# Patient Record
Sex: MALE | Race: BLACK OR AFRICAN AMERICAN | Employment: UNEMPLOYED | URBAN - METROPOLITAN AREA
[De-identification: names, ages, dates, MRNs, and addresses within clinical notes are randomized per-mention and may not be internally consistent; named-entity substitution may affect disease eponyms.]

---

## 2018-04-18 ENCOUNTER — APPOINTMENT (OUTPATIENT)
Dept: OTHER | Age: 43
End: 2018-04-18
Attending: FAMILY MEDICINE

## 2021-06-23 ENCOUNTER — HOSPITAL ENCOUNTER (OUTPATIENT)
Age: 46
Setting detail: OBSERVATION
Discharge: HOME | End: 2021-06-25
Attending: EMERGENCY MEDICINE

## 2021-06-23 ENCOUNTER — APPOINTMENT (OUTPATIENT)
Dept: GENERAL RADIOLOGY | Age: 46
End: 2021-06-23
Attending: EMERGENCY MEDICINE

## 2021-06-23 ENCOUNTER — APPOINTMENT (OUTPATIENT)
Dept: CT IMAGING | Age: 46
End: 2021-06-23
Attending: EMERGENCY MEDICINE

## 2021-06-23 DIAGNOSIS — R55 VASOVAGAL SYNCOPE: ICD-10-CM

## 2021-06-23 DIAGNOSIS — R73.9 HYPERGLYCEMIA: ICD-10-CM

## 2021-06-23 DIAGNOSIS — E78.2 MIXED DYSLIPIDEMIA: ICD-10-CM

## 2021-06-23 DIAGNOSIS — I11.9 HYPERTENSIVE HEART DISEASE WITHOUT HEART FAILURE: ICD-10-CM

## 2021-06-23 DIAGNOSIS — E11.69 TYPE 2 DIABETES MELLITUS WITH OTHER SPECIFIED COMPLICATION, UNSPECIFIED WHETHER LONG TERM INSULIN USE (CMD): ICD-10-CM

## 2021-06-23 DIAGNOSIS — N17.9 AKI (ACUTE KIDNEY INJURY) (CMD): Primary | ICD-10-CM

## 2021-06-23 LAB
ALBUMIN SERPL-MCNC: 3.5 G/DL (ref 3.6–5.1)
ALBUMIN/GLOB SERPL: 0.8 {RATIO} (ref 1–2.4)
ALP SERPL-CCNC: 59 UNITS/L (ref 45–117)
ALT SERPL-CCNC: 21 UNITS/L
ANION GAP SERPL CALC-SCNC: 15 MMOL/L (ref 10–20)
AST SERPL-CCNC: 13 UNITS/L
BASOPHILS # BLD: 0.1 K/MCL (ref 0–0.3)
BASOPHILS NFR BLD: 1 %
BILIRUB SERPL-MCNC: 0.4 MG/DL (ref 0.2–1)
BUN SERPL-MCNC: 34 MG/DL (ref 6–20)
BUN/CREAT SERPL: 14 (ref 7–25)
CALCIUM SERPL-MCNC: 9.2 MG/DL (ref 8.4–10.2)
CHLORIDE SERPL-SCNC: 93 MMOL/L (ref 98–107)
CO2 SERPL-SCNC: 28 MMOL/L (ref 21–32)
CREAT SERPL-MCNC: 2.41 MG/DL (ref 0.67–1.17)
DEPRECATED RDW RBC: 41 FL (ref 39–50)
EOSINOPHIL # BLD: 0.2 K/MCL (ref 0–0.5)
EOSINOPHIL NFR BLD: 2 %
ERYTHROCYTE [DISTWIDTH] IN BLOOD: 13.1 % (ref 11–15)
FASTING DURATION TIME PATIENT: ABNORMAL H
GFR SERPLBLD BASED ON 1.73 SQ M-ARVRAT: 36 ML/MIN/1.73M2
GLOBULIN SER-MCNC: 4.6 G/DL (ref 2–4)
GLUCOSE BLDC GLUCOMTR-MCNC: 431 MG/DL (ref 70–99)
GLUCOSE BLDC GLUCOMTR-MCNC: 560 MG/DL (ref 70–99)
GLUCOSE SERPL-MCNC: 577 MG/DL (ref 65–99)
HCT VFR BLD CALC: 36.7 % (ref 39–51)
HGB BLD-MCNC: 11.9 G/DL (ref 13–17)
IMM GRANULOCYTES # BLD AUTO: 0.1 K/MCL (ref 0–0.2)
IMM GRANULOCYTES # BLD: 1 %
LYMPHOCYTES # BLD: 3.2 K/MCL (ref 1–4.8)
LYMPHOCYTES NFR BLD: 32 %
MCH RBC QN AUTO: 27.6 PG (ref 26–34)
MCHC RBC AUTO-ENTMCNC: 32.4 G/DL (ref 32–36.5)
MCV RBC AUTO: 85.2 FL (ref 78–100)
MONOCYTES # BLD: 1.4 K/MCL (ref 0.3–0.9)
MONOCYTES NFR BLD: 14 %
NEUTROPHILS # BLD: 5.1 K/MCL (ref 1.8–7.7)
NEUTROPHILS NFR BLD: 50 %
NRBC BLD MANUAL-RTO: 0 /100 WBC
PLATELET # BLD AUTO: 351 K/MCL (ref 140–450)
POTASSIUM SERPL-SCNC: 3.9 MMOL/L (ref 3.4–5.1)
PROT SERPL-MCNC: 8.1 G/DL (ref 6.4–8.2)
RBC # BLD: 4.31 MIL/MCL (ref 4.5–5.9)
SODIUM SERPL-SCNC: 132 MMOL/L (ref 135–145)
TROPONIN I SERPL HS-MCNC: <0.02 NG/ML
WBC # BLD: 10 K/MCL (ref 4.2–11)

## 2021-06-23 PROCEDURE — 36415 COLL VENOUS BLD VENIPUNCTURE: CPT

## 2021-06-23 PROCEDURE — 71045 X-RAY EXAM CHEST 1 VIEW: CPT

## 2021-06-23 PROCEDURE — 70450 CT HEAD/BRAIN W/O DYE: CPT

## 2021-06-23 PROCEDURE — 99285 EMERGENCY DEPT VISIT HI MDM: CPT | Performed by: EMERGENCY MEDICINE

## 2021-06-23 PROCEDURE — 82010 KETONE BODYS QUAN: CPT | Performed by: EMERGENCY MEDICINE

## 2021-06-23 PROCEDURE — 82962 GLUCOSE BLOOD TEST: CPT

## 2021-06-23 PROCEDURE — 83036 HEMOGLOBIN GLYCOSYLATED A1C: CPT | Performed by: HOSPITALIST

## 2021-06-23 PROCEDURE — 85025 COMPLETE CBC W/AUTO DIFF WBC: CPT | Performed by: EMERGENCY MEDICINE

## 2021-06-23 PROCEDURE — 93005 ELECTROCARDIOGRAM TRACING: CPT | Performed by: EMERGENCY MEDICINE

## 2021-06-23 PROCEDURE — 84443 ASSAY THYROID STIM HORMONE: CPT | Performed by: HOSPITALIST

## 2021-06-23 PROCEDURE — 80053 COMPREHEN METABOLIC PANEL: CPT | Performed by: EMERGENCY MEDICINE

## 2021-06-23 PROCEDURE — 93010 ELECTROCARDIOGRAM REPORT: CPT | Performed by: INTERNAL MEDICINE

## 2021-06-23 PROCEDURE — G1004 CDSM NDSC: HCPCS

## 2021-06-23 PROCEDURE — 99285 EMERGENCY DEPT VISIT HI MDM: CPT

## 2021-06-23 PROCEDURE — 84484 ASSAY OF TROPONIN QUANT: CPT | Performed by: EMERGENCY MEDICINE

## 2021-06-23 ASSESSMENT — ENCOUNTER SYMPTOMS
DIARRHEA: 0
NAUSEA: 0
EYE PAIN: 0
SPEECH DIFFICULTY: 0
ABDOMINAL PAIN: 0
RHINORRHEA: 0
FEVER: 0
WEAKNESS: 1
BRUISES/BLEEDS EASILY: 0
CONSTIPATION: 0
TREMORS: 0
NUMBNESS: 0
DIAPHORESIS: 0
EYE REDNESS: 0
DIZZINESS: 0
CHILLS: 0
HEADACHES: 0
BACK PAIN: 0
VOMITING: 0
SHORTNESS OF BREATH: 0
FATIGUE: 0
POLYDIPSIA: 0
SORE THROAT: 0
COUGH: 0

## 2021-06-23 ASSESSMENT — PAIN SCALES - GENERAL: PAINLEVEL_OUTOF10: 0

## 2021-06-24 ENCOUNTER — APPOINTMENT (OUTPATIENT)
Dept: GENERAL RADIOLOGY | Age: 46
End: 2021-06-24
Attending: EMERGENCY MEDICINE

## 2021-06-24 ENCOUNTER — APPOINTMENT (OUTPATIENT)
Dept: CARDIOLOGY | Age: 46
End: 2021-06-24
Attending: HOSPITALIST

## 2021-06-24 LAB
ALBUMIN SERPL-MCNC: 3.3 G/DL (ref 3.6–5.1)
ALBUMIN/GLOB SERPL: 0.7 {RATIO} (ref 1–2.4)
ALP SERPL-CCNC: 51 UNITS/L (ref 45–117)
ALT SERPL-CCNC: 20 UNITS/L
ANION GAP SERPL CALC-SCNC: 14 MMOL/L (ref 10–20)
APPEARANCE UR: CLEAR
AST SERPL-CCNC: 12 UNITS/L
ATRIAL RATE (BPM): 89
B-OH-BUTYR SERPL-SCNC: 0.1 MMOL/L (ref 0–0.3)
BASE EXCESS / DEFICIT, ARTERIAL - RESPIRATORY: 3 MMOL/L (ref -2–3)
BASOPHILS # BLD: 0.1 K/MCL (ref 0–0.3)
BASOPHILS NFR BLD: 1 %
BDY SITE: ABNORMAL
BILIRUB SERPL-MCNC: 0.3 MG/DL (ref 0.2–1)
BILIRUB UR QL STRIP: NEGATIVE
BODY TEMPERATURE: 37 DEGREES
BUN SERPL-MCNC: 30 MG/DL (ref 6–20)
BUN/CREAT SERPL: 17 (ref 7–25)
CA-I BLD-SCNC: 1.16 MMOL/L (ref 1.15–1.29)
CALCIUM SERPL-MCNC: 9.2 MG/DL (ref 8.4–10.2)
CHLORIDE BLD-SCNC: 93 MMOL/L (ref 98–107)
CHLORIDE SERPL-SCNC: 98 MMOL/L (ref 98–107)
CHOLEST SERPL-MCNC: 114 MG/DL
CHOLEST/HDLC SERPL: 3 {RATIO}
CO2 SERPL-SCNC: 29 MMOL/L (ref 21–32)
COHGB MFR BLDV: 1.6 % (ref 1.5–15)
COLOR UR: YELLOW
CONDITION: ABNORMAL
CREAT SERPL-MCNC: 1.77 MG/DL (ref 0.67–1.17)
CREAT UR-MCNC: 62.71 MG/DL
DEPRECATED RDW RBC: 40.3 FL (ref 39–50)
EOSINOPHIL # BLD: 0.2 K/MCL (ref 0–0.5)
EOSINOPHIL NFR BLD: 2 %
ERYTHROCYTE [DISTWIDTH] IN BLOOD: 13.2 % (ref 11–15)
FASTING DURATION TIME PATIENT: ABNORMAL H
FIO2 ON VENT: 21 %
GFR SERPLBLD BASED ON 1.73 SQ M-ARVRAT: 53 ML/MIN/1.73M2
GLOBULIN SER-MCNC: 4.5 G/DL (ref 2–4)
GLUCOSE BLD-MCNC: 428 MG/DL (ref 65–99)
GLUCOSE BLDC GLUCOMTR-MCNC: 169 MG/DL (ref 70–99)
GLUCOSE BLDC GLUCOMTR-MCNC: 249 MG/DL (ref 70–99)
GLUCOSE BLDC GLUCOMTR-MCNC: 262 MG/DL (ref 70–99)
GLUCOSE BLDC GLUCOMTR-MCNC: 267 MG/DL (ref 70–99)
GLUCOSE BLDC GLUCOMTR-MCNC: 321 MG/DL (ref 70–99)
GLUCOSE BLDC GLUCOMTR-MCNC: 369 MG/DL (ref 70–99)
GLUCOSE SERPL-MCNC: 200 MG/DL (ref 65–99)
GLUCOSE UR STRIP-MCNC: >1000 MG/DL
HBA1C MFR BLD: 12.5 % (ref 4.5–5.6)
HBA1C MFR BLD: 12.9 % (ref 4.5–5.6)
HCO3 BLDA-SCNC: 27 MMOL/L (ref 22–28)
HCT VFR BLD CALC: 35 % (ref 39–51)
HDLC SERPL-MCNC: 38 MG/DL
HGB BLD-MCNC: 11.3 G/DL (ref 13–17)
HGB BLD-MCNC: 11.9 G/DL (ref 13–17)
HGB UR QL STRIP: NEGATIVE
HOROWITZ INDEX BLDA+IHG-RTO: 486 MM[HG] (ref 300–500)
IMM GRANULOCYTES # BLD AUTO: 0.1 K/MCL (ref 0–0.2)
IMM GRANULOCYTES # BLD: 1 %
KETONES UR STRIP-MCNC: NEGATIVE MG/DL
LACTATE BLDA-SCNC: 2.3 MMOL/L
LDLC SERPL CALC-MCNC: 45 MG/DL
LEUKOCYTE ESTERASE UR QL STRIP: NEGATIVE
LYMPHOCYTES # BLD: 2.9 K/MCL (ref 1–4.8)
LYMPHOCYTES NFR BLD: 31 %
MAGNESIUM SERPL-MCNC: 2.4 MG/DL (ref 1.7–2.4)
MCH RBC QN AUTO: 27.1 PG (ref 26–34)
MCHC RBC AUTO-ENTMCNC: 32.3 G/DL (ref 32–36.5)
MCV RBC AUTO: 83.9 FL (ref 78–100)
METHGB MFR BLDMV: 0.6 %
MONOCYTES # BLD: 1.1 K/MCL (ref 0.3–0.9)
MONOCYTES NFR BLD: 12 %
NEUTROPHILS # BLD: 4.9 K/MCL (ref 1.8–7.7)
NEUTROPHILS NFR BLD: 53 %
NITRITE UR QL STRIP: NEGATIVE
NONHDLC SERPL-MCNC: 76 MG/DL
NRBC BLD MANUAL-RTO: 0 /100 WBC
OXYHGB MFR BLDA: 97.2 % (ref 94–98)
P AXIS (DEGREES): 26
PCO2 BLDA: 35 MM HG (ref 35–48)
PH BLDA: 7.49 UNITS (ref 7.35–7.45)
PH UR STRIP: 6 [PH] (ref 5–7)
PLATELET # BLD AUTO: 332 K/MCL (ref 140–450)
PO2 BLDA: 102 MM HG (ref 83–108)
POTASSIUM BLD-SCNC: 3.7 MMOL/L (ref 3.4–5.1)
POTASSIUM SERPL-SCNC: 3.2 MMOL/L (ref 3.4–5.1)
POTASSIUM SERPL-SCNC: 3.6 MMOL/L (ref 3.4–5.1)
PR-INTERVAL (MSEC): 180
PROT SERPL-MCNC: 7.8 G/DL (ref 6.4–8.2)
PROT UR STRIP-MCNC: NEGATIVE MG/DL
PROT UR-MCNC: 20 MG/DL
PROT/CREAT UR: 319 MGPR/GCR
QRS-INTERVAL (MSEC): 92
QT-INTERVAL (MSEC): 380
QTC: 462
R AXIS (DEGREES): 68
RAINBOW EXTRA TUBES HOLD SPECIMEN: NORMAL
RBC # BLD: 4.17 MIL/MCL (ref 4.5–5.9)
REPORT TEXT: NORMAL
SAO2 % BLDA: 16 % (ref 15–23)
SAO2 % BLDA: 99 % (ref 95–99)
SARS-COV-2 RNA RESP QL NAA+PROBE: NOT DETECTED
SERVICE CMNT-IMP: NORMAL
SERVICE CMNT-IMP: NORMAL
SODIUM BLD-SCNC: 132 MMOL/L (ref 135–145)
SODIUM SERPL-SCNC: 138 MMOL/L (ref 135–145)
SODIUM UR-SCNC: 79 MMOL/L
SP GR UR STRIP: 1.01 (ref 1–1.03)
T AXIS (DEGREES): 27
TRIGL SERPL-MCNC: 156 MG/DL
TRIGL SERPL-MCNC: 257 MG/DL
TROPONIN I SERPL HS-MCNC: <0.02 NG/ML
TROPONIN I SERPL HS-MCNC: <0.02 NG/ML
TSH SERPL-ACNC: 1.36 MCUNITS/ML (ref 0.35–5)
UROBILINOGEN UR STRIP-MCNC: 0.2 MG/DL
VENTRICULAR RATE EKG/MIN (BPM): 89
WBC # BLD: 9.3 K/MCL (ref 4.2–11)

## 2021-06-24 PROCEDURE — 82962 GLUCOSE BLOOD TEST: CPT

## 2021-06-24 PROCEDURE — 83605 ASSAY OF LACTIC ACID: CPT

## 2021-06-24 PROCEDURE — 10002800 HB RX 250 W HCPCS: Performed by: HOSPITALIST

## 2021-06-24 PROCEDURE — 73630 X-RAY EXAM OF FOOT: CPT

## 2021-06-24 PROCEDURE — 93306 TTE W/DOPPLER COMPLETE: CPT | Performed by: INTERNAL MEDICINE

## 2021-06-24 PROCEDURE — 10002803 HB RX 637: Performed by: FAMILY MEDICINE

## 2021-06-24 PROCEDURE — 83036 HEMOGLOBIN GLYCOSYLATED A1C: CPT | Performed by: FAMILY MEDICINE

## 2021-06-24 PROCEDURE — G0378 HOSPITAL OBSERVATION PER HR: HCPCS

## 2021-06-24 PROCEDURE — 82330 ASSAY OF CALCIUM: CPT

## 2021-06-24 PROCEDURE — 85025 COMPLETE CBC W/AUTO DIFF WBC: CPT | Performed by: HOSPITALIST

## 2021-06-24 PROCEDURE — 83735 ASSAY OF MAGNESIUM: CPT | Performed by: HOSPITALIST

## 2021-06-24 PROCEDURE — 82375 ASSAY CARBOXYHB QUANT: CPT

## 2021-06-24 PROCEDURE — 10002800 HB RX 250 W HCPCS: Performed by: EMERGENCY MEDICINE

## 2021-06-24 PROCEDURE — 71045 X-RAY EXAM CHEST 1 VIEW: CPT

## 2021-06-24 PROCEDURE — 84484 ASSAY OF TROPONIN QUANT: CPT | Performed by: HOSPITALIST

## 2021-06-24 PROCEDURE — 96372 THER/PROPH/DIAG INJ SC/IM: CPT

## 2021-06-24 PROCEDURE — 36600 WITHDRAWAL OF ARTERIAL BLOOD: CPT

## 2021-06-24 PROCEDURE — 10004651 HB RX, NO CHARGE ITEM: Performed by: HOSPITALIST

## 2021-06-24 PROCEDURE — 84300 ASSAY OF URINE SODIUM: CPT | Performed by: HOSPITALIST

## 2021-06-24 PROCEDURE — 84484 ASSAY OF TROPONIN QUANT: CPT | Performed by: FAMILY MEDICINE

## 2021-06-24 PROCEDURE — 10002807 HB RX 258: Performed by: EMERGENCY MEDICINE

## 2021-06-24 PROCEDURE — 87635 SARS-COV-2 COVID-19 AMP PRB: CPT | Performed by: EMERGENCY MEDICINE

## 2021-06-24 PROCEDURE — 84478 ASSAY OF TRIGLYCERIDES: CPT | Performed by: INTERNAL MEDICINE

## 2021-06-24 PROCEDURE — 10002803 HB RX 637: Performed by: INTERNAL MEDICINE

## 2021-06-24 PROCEDURE — 80061 LIPID PANEL: CPT | Performed by: HOSPITALIST

## 2021-06-24 PROCEDURE — 85018 HEMOGLOBIN: CPT

## 2021-06-24 PROCEDURE — 99245 OFF/OP CONSLTJ NEW/EST HI 55: CPT | Performed by: INTERNAL MEDICINE

## 2021-06-24 PROCEDURE — 81003 URINALYSIS AUTO W/O SCOPE: CPT | Performed by: HOSPITALIST

## 2021-06-24 PROCEDURE — 36415 COLL VENOUS BLD VENIPUNCTURE: CPT | Performed by: HOSPITALIST

## 2021-06-24 PROCEDURE — 93306 TTE W/DOPPLER COMPLETE: CPT

## 2021-06-24 PROCEDURE — 84295 ASSAY OF SERUM SODIUM: CPT

## 2021-06-24 PROCEDURE — 84156 ASSAY OF PROTEIN URINE: CPT | Performed by: HOSPITALIST

## 2021-06-24 PROCEDURE — 82947 ASSAY GLUCOSE BLOOD QUANT: CPT

## 2021-06-24 PROCEDURE — 84132 ASSAY OF SERUM POTASSIUM: CPT | Performed by: HOSPITALIST

## 2021-06-24 PROCEDURE — 10002803 HB RX 637: Performed by: HOSPITALIST

## 2021-06-24 PROCEDURE — 99244 OFF/OP CNSLTJ NEW/EST MOD 40: CPT | Performed by: INTERNAL MEDICINE

## 2021-06-24 PROCEDURE — 99220 INITIAL OBSERVATION CARE,LEVL III: CPT | Performed by: FAMILY MEDICINE

## 2021-06-24 PROCEDURE — 10002807 HB RX 258: Performed by: HOSPITALIST

## 2021-06-24 PROCEDURE — 80053 COMPREHEN METABOLIC PANEL: CPT | Performed by: HOSPITALIST

## 2021-06-24 PROCEDURE — 73560 X-RAY EXAM OF KNEE 1 OR 2: CPT

## 2021-06-24 RX ORDER — VALSARTAN AND HYDROCHLOROTHIAZIDE 160; 25 MG/1; MG/1
1 TABLET ORAL DAILY
Status: ON HOLD | COMMUNITY
End: 2021-06-25 | Stop reason: HOSPADM

## 2021-06-24 RX ORDER — COLCHICINE 0.6 MG/1
0.6 TABLET ORAL DAILY
Status: DISCONTINUED | OUTPATIENT
Start: 2021-06-24 | End: 2021-06-25 | Stop reason: HOSPADM

## 2021-06-24 RX ORDER — DEXTROSE MONOHYDRATE 25 G/50ML
25 INJECTION, SOLUTION INTRAVENOUS PRN
Status: DISCONTINUED | OUTPATIENT
Start: 2021-06-24 | End: 2021-06-25 | Stop reason: HOSPADM

## 2021-06-24 RX ORDER — GABAPENTIN 300 MG/1
300 CAPSULE ORAL DAILY
Status: ON HOLD | COMMUNITY
End: 2021-06-25 | Stop reason: HOSPADM

## 2021-06-24 RX ORDER — GLIPIZIDE 10 MG/1
10 TABLET ORAL 2 TIMES DAILY
Status: ON HOLD | COMMUNITY
End: 2021-06-25 | Stop reason: HOSPADM

## 2021-06-24 RX ORDER — DICLOFENAC SODIUM 75 MG/1
75 TABLET, DELAYED RELEASE ORAL 2 TIMES DAILY
Status: DISCONTINUED | OUTPATIENT
Start: 2021-06-24 | End: 2021-06-25

## 2021-06-24 RX ORDER — PRAVASTATIN SODIUM 20 MG
20 TABLET ORAL NIGHTLY
Status: DISCONTINUED | OUTPATIENT
Start: 2021-06-24 | End: 2021-06-25

## 2021-06-24 RX ORDER — GEMFIBROZIL 600 MG/1
600 TABLET, FILM COATED ORAL DAILY
Status: ON HOLD | COMMUNITY
End: 2021-06-25 | Stop reason: ALTCHOICE

## 2021-06-24 RX ORDER — INSULIN LISPRO 200 [IU]/ML
10 INJECTION, SOLUTION SUBCUTANEOUS
Status: ON HOLD | COMMUNITY
End: 2021-06-25 | Stop reason: HOSPADM

## 2021-06-24 RX ORDER — 0.9 % SODIUM CHLORIDE 0.9 %
2 VIAL (ML) INJECTION EVERY 12 HOURS SCHEDULED
Status: DISCONTINUED | OUTPATIENT
Start: 2021-06-24 | End: 2021-06-25 | Stop reason: HOSPADM

## 2021-06-24 RX ORDER — HEPARIN SODIUM 5000 [USP'U]/ML
5000 INJECTION, SOLUTION INTRAVENOUS; SUBCUTANEOUS EVERY 8 HOURS SCHEDULED
Status: DISCONTINUED | OUTPATIENT
Start: 2021-06-24 | End: 2021-06-25 | Stop reason: HOSPADM

## 2021-06-24 RX ORDER — ALLOPURINOL 300 MG/1
300 TABLET ORAL DAILY
COMMUNITY

## 2021-06-24 RX ORDER — ALLOPURINOL 300 MG/1
300 TABLET ORAL DAILY
Status: DISCONTINUED | OUTPATIENT
Start: 2021-06-24 | End: 2021-06-25 | Stop reason: HOSPADM

## 2021-06-24 RX ORDER — GLIPIZIDE 5 MG/1
5 TABLET ORAL 2 TIMES DAILY WITH MEALS
Status: DISCONTINUED | OUTPATIENT
Start: 2021-06-24 | End: 2021-06-24

## 2021-06-24 RX ORDER — INSULIN GLARGINE 100 [IU]/ML
25 INJECTION, SOLUTION SUBCUTANEOUS NIGHTLY
Status: DISCONTINUED | OUTPATIENT
Start: 2021-06-25 | End: 2021-06-24

## 2021-06-24 RX ORDER — AMOXICILLIN 250 MG
2 CAPSULE ORAL DAILY PRN
Status: DISCONTINUED | OUTPATIENT
Start: 2021-06-24 | End: 2021-06-25 | Stop reason: HOSPADM

## 2021-06-24 RX ORDER — INSULIN HUMAN 100 [IU]/ML
38 INJECTION, SUSPENSION SUBCUTANEOUS 2 TIMES DAILY WITH MEALS
Status: ON HOLD | COMMUNITY
End: 2021-06-25 | Stop reason: SDUPTHER

## 2021-06-24 RX ORDER — CARVEDILOL 25 MG/1
25 TABLET ORAL 2 TIMES DAILY
Status: DISCONTINUED | OUTPATIENT
Start: 2021-06-24 | End: 2021-06-25 | Stop reason: HOSPADM

## 2021-06-24 RX ORDER — ONDANSETRON 2 MG/ML
4 INJECTION INTRAMUSCULAR; INTRAVENOUS 4 TIMES DAILY PRN
Status: DISCONTINUED | OUTPATIENT
Start: 2021-06-24 | End: 2021-06-25 | Stop reason: HOSPADM

## 2021-06-24 RX ORDER — GLIPIZIDE 10 MG/1
10 TABLET ORAL 2 TIMES DAILY
Status: DISCONTINUED | OUTPATIENT
Start: 2021-06-24 | End: 2021-06-24

## 2021-06-24 RX ORDER — SODIUM CHLORIDE 9 MG/ML
INJECTION, SOLUTION INTRAVENOUS CONTINUOUS
Status: DISCONTINUED | OUTPATIENT
Start: 2021-06-24 | End: 2021-06-25 | Stop reason: HOSPADM

## 2021-06-24 RX ORDER — ACETAMINOPHEN 325 MG/1
650 TABLET ORAL EVERY 4 HOURS PRN
Status: DISCONTINUED | OUTPATIENT
Start: 2021-06-24 | End: 2021-06-25 | Stop reason: HOSPADM

## 2021-06-24 RX ORDER — GABAPENTIN 300 MG/1
300 CAPSULE ORAL DAILY
Status: DISCONTINUED | OUTPATIENT
Start: 2021-06-24 | End: 2021-06-24

## 2021-06-24 RX ORDER — INSULIN LISPRO 100 [IU]/ML
10 INJECTION, SOLUTION INTRAVENOUS; SUBCUTANEOUS ONCE
Status: COMPLETED | OUTPATIENT
Start: 2021-06-24 | End: 2021-06-24

## 2021-06-24 RX ORDER — GABAPENTIN 300 MG/1
300 CAPSULE ORAL 3 TIMES DAILY
Status: DISCONTINUED | OUTPATIENT
Start: 2021-06-24 | End: 2021-06-25 | Stop reason: HOSPADM

## 2021-06-24 RX ORDER — DEXTROSE MONOHYDRATE 25 G/50ML
12.5 INJECTION, SOLUTION INTRAVENOUS PRN
Status: DISCONTINUED | OUTPATIENT
Start: 2021-06-24 | End: 2021-06-25 | Stop reason: HOSPADM

## 2021-06-24 RX ORDER — CARVEDILOL 25 MG/1
25 TABLET ORAL 2 TIMES DAILY
COMMUNITY

## 2021-06-24 RX ORDER — POTASSIUM CHLORIDE 20 MEQ/1
40 TABLET, EXTENDED RELEASE ORAL ONCE
Status: COMPLETED | OUTPATIENT
Start: 2021-06-24 | End: 2021-06-24

## 2021-06-24 RX ORDER — POLYETHYLENE GLYCOL 3350 17 G/17G
17 POWDER, FOR SOLUTION ORAL DAILY PRN
Status: DISCONTINUED | OUTPATIENT
Start: 2021-06-24 | End: 2021-06-25 | Stop reason: HOSPADM

## 2021-06-24 RX ORDER — PRAVASTATIN SODIUM 20 MG
20 TABLET ORAL DAILY
Status: ON HOLD | COMMUNITY
End: 2021-06-25 | Stop reason: HOSPADM

## 2021-06-24 RX ORDER — INSULIN GLARGINE 100 [IU]/ML
15 INJECTION, SOLUTION SUBCUTANEOUS ONCE
Status: COMPLETED | OUTPATIENT
Start: 2021-06-24 | End: 2021-06-24

## 2021-06-24 RX ORDER — INSULIN GLARGINE 100 [IU]/ML
22 INJECTION, SOLUTION SUBCUTANEOUS NIGHTLY
Status: DISCONTINUED | OUTPATIENT
Start: 2021-06-25 | End: 2021-06-24

## 2021-06-24 RX ORDER — HYDRALAZINE HYDROCHLORIDE 20 MG/ML
10 INJECTION INTRAMUSCULAR; INTRAVENOUS EVERY 6 HOURS PRN
Status: DISCONTINUED | OUTPATIENT
Start: 2021-06-24 | End: 2021-06-25 | Stop reason: HOSPADM

## 2021-06-24 RX ORDER — DICLOFENAC SODIUM 75 MG/1
75 TABLET, DELAYED RELEASE ORAL 2 TIMES DAILY
COMMUNITY

## 2021-06-24 RX ORDER — INSULIN GLARGINE 100 [IU]/ML
15 INJECTION, SOLUTION SUBCUTANEOUS NIGHTLY
Status: DISCONTINUED | OUTPATIENT
Start: 2021-06-25 | End: 2021-06-24

## 2021-06-24 RX ORDER — HYDROCODONE BITARTRATE AND ACETAMINOPHEN 5; 325 MG/1; MG/1
1 TABLET ORAL EVERY 4 HOURS PRN
Status: DISCONTINUED | OUTPATIENT
Start: 2021-06-24 | End: 2021-06-25 | Stop reason: HOSPADM

## 2021-06-24 RX ORDER — HYDROCHLOROTHIAZIDE 25 MG/1
25 TABLET ORAL DAILY
Status: DISCONTINUED | OUTPATIENT
Start: 2021-06-25 | End: 2021-06-25

## 2021-06-24 RX ORDER — VALSARTAN 160 MG/1
160 TABLET ORAL DAILY
Status: DISCONTINUED | OUTPATIENT
Start: 2021-06-25 | End: 2021-06-25 | Stop reason: HOSPADM

## 2021-06-24 RX ORDER — PRAVASTATIN SODIUM 20 MG
20 TABLET ORAL 2 TIMES DAILY
Status: ON HOLD | COMMUNITY
End: 2021-06-24

## 2021-06-24 RX ORDER — NICOTINE POLACRILEX 4 MG
30 LOZENGE BUCCAL PRN
Status: DISCONTINUED | OUTPATIENT
Start: 2021-06-24 | End: 2021-06-25 | Stop reason: HOSPADM

## 2021-06-24 RX ORDER — LANOLIN ALCOHOL/MO/W.PET/CERES
3 CREAM (GRAM) TOPICAL
Status: DISCONTINUED | OUTPATIENT
Start: 2021-06-24 | End: 2021-06-25 | Stop reason: HOSPADM

## 2021-06-24 RX ORDER — PRAVASTATIN SODIUM 20 MG
20 TABLET ORAL 2 TIMES DAILY
Status: DISCONTINUED | OUTPATIENT
Start: 2021-06-24 | End: 2021-06-24

## 2021-06-24 RX ORDER — NICOTINE POLACRILEX 4 MG
15 LOZENGE BUCCAL PRN
Status: DISCONTINUED | OUTPATIENT
Start: 2021-06-24 | End: 2021-06-25 | Stop reason: HOSPADM

## 2021-06-24 RX ADMIN — INSULIN LISPRO 6 UNITS: 100 INJECTION, SOLUTION INTRAVENOUS; SUBCUTANEOUS at 12:37

## 2021-06-24 RX ADMIN — HYDROCODONE BITARTRATE AND ACETAMINOPHEN 1 TABLET: 5; 325 TABLET ORAL at 16:02

## 2021-06-24 RX ADMIN — SODIUM CHLORIDE, PRESERVATIVE FREE 2 ML: 5 INJECTION INTRAVENOUS at 21:38

## 2021-06-24 RX ADMIN — INSULIN LISPRO 4 UNITS: 100 INJECTION, SOLUTION INTRAVENOUS; SUBCUTANEOUS at 18:03

## 2021-06-24 RX ADMIN — HYDROCODONE BITARTRATE AND ACETAMINOPHEN 1 TABLET: 5; 325 TABLET ORAL at 06:21

## 2021-06-24 RX ADMIN — PRAVASTATIN SODIUM 20 MG: 20 TABLET ORAL at 21:36

## 2021-06-24 RX ADMIN — SODIUM CHLORIDE, PRESERVATIVE FREE 2 ML: 5 INJECTION INTRAVENOUS at 04:53

## 2021-06-24 RX ADMIN — GABAPENTIN 300 MG: 300 CAPSULE ORAL at 16:02

## 2021-06-24 RX ADMIN — SODIUM CHLORIDE, PRESERVATIVE FREE 2 ML: 5 INJECTION INTRAVENOUS at 08:50

## 2021-06-24 RX ADMIN — COLCHICINE 0.6 MG: 0.6 TABLET, FILM COATED ORAL at 12:37

## 2021-06-24 RX ADMIN — GLIPIZIDE 5 MG: 5 TABLET ORAL at 18:03

## 2021-06-24 RX ADMIN — HEPARIN SODIUM 5000 UNITS: 5000 INJECTION INTRAVENOUS; SUBCUTANEOUS at 21:36

## 2021-06-24 RX ADMIN — POTASSIUM CHLORIDE 40 MEQ: 1500 TABLET, EXTENDED RELEASE ORAL at 08:50

## 2021-06-24 RX ADMIN — SODIUM CHLORIDE: 0.9 INJECTION, SOLUTION INTRAVENOUS at 04:51

## 2021-06-24 RX ADMIN — CARVEDILOL 25 MG: 25 TABLET, FILM COATED ORAL at 21:35

## 2021-06-24 RX ADMIN — INSULIN LISPRO 2 UNITS: 100 INJECTION, SOLUTION INTRAVENOUS; SUBCUTANEOUS at 21:36

## 2021-06-24 RX ADMIN — INSULIN LISPRO 3 UNITS: 100 INJECTION, SOLUTION INTRAVENOUS; SUBCUTANEOUS at 03:24

## 2021-06-24 RX ADMIN — INSULIN GLARGINE 15 UNITS: 100 INJECTION, SOLUTION SUBCUTANEOUS at 01:01

## 2021-06-24 RX ADMIN — SITAGLIPTIN 100 MG: 100 TABLET, FILM COATED ORAL at 13:39

## 2021-06-24 RX ADMIN — HEPARIN SODIUM 5000 UNITS: 5000 INJECTION INTRAVENOUS; SUBCUTANEOUS at 12:37

## 2021-06-24 RX ADMIN — DICLOFENAC SODIUM 75 MG: 75 TABLET, DELAYED RELEASE ORAL at 21:36

## 2021-06-24 RX ADMIN — INSULIN LISPRO 10 UNITS: 100 INJECTION, SOLUTION INTRAVENOUS; SUBCUTANEOUS at 00:59

## 2021-06-24 RX ADMIN — HEPARIN SODIUM 5000 UNITS: 5000 INJECTION INTRAVENOUS; SUBCUTANEOUS at 06:21

## 2021-06-24 RX ADMIN — SODIUM CHLORIDE 1000 ML: 9 INJECTION, SOLUTION INTRAVENOUS at 00:15

## 2021-06-24 RX ADMIN — SODIUM CHLORIDE: 0.9 INJECTION, SOLUTION INTRAVENOUS at 15:53

## 2021-06-24 RX ADMIN — GABAPENTIN 300 MG: 300 CAPSULE ORAL at 21:35

## 2021-06-24 RX ADMIN — ALLOPURINOL 300 MG: 300 TABLET ORAL at 18:03

## 2021-06-24 RX ADMIN — INSULIN LISPRO 2 UNITS: 100 INJECTION, SOLUTION INTRAVENOUS; SUBCUTANEOUS at 08:49

## 2021-06-24 RX ADMIN — INSULIN LISPRO 7 UNITS: 100 INJECTION, SOLUTION INTRAVENOUS; SUBCUTANEOUS at 08:50

## 2021-06-24 ASSESSMENT — LIFESTYLE VARIABLES
HOW OFTEN DO YOU HAVE 6 OR MORE DRINKS ON ONE OCCASION: LESS THAN MONTHLY
HOW OFTEN DO YOU HAVE A DRINK CONTAINING ALCOHOL: MONTHLY OR LESS
HOW MANY STANDARD DRINKS CONTAINING ALCOHOL DO YOU HAVE ON A TYPICAL DAY: 0,1 OR 2
HOW OFTEN DO YOU HAVE A DRINK CONTAINING ALCOHOL: 2 TO 3 TIMES PER WEEK
AUDIT-C TOTAL SCORE: 4
ALCOHOL_USE_STATUS: NO OR LOW RISK WITH VALIDATED TOOL
HOW MANY STANDARD DRINKS CONTAINING ALCOHOL DO YOU HAVE ON A TYPICAL DAY: 3 OR 4
ALCOHOL_USE_STATUS: NO OR LOW RISK WITH VALIDATED TOOL
HOW OFTEN DO YOU HAVE 6 OR MORE DRINKS ON ONE OCCASION: LESS THAN MONTHLY
AUDIT-C TOTAL SCORE: 3

## 2021-06-24 ASSESSMENT — ACTIVITIES OF DAILY LIVING (ADL)
RECENT_DECLINE_ADL: YES, ACUTE ILLNESS WITHOUT THERAPY NEEDS
CHRONIC_PAIN_PRESENT: NO
CHRONIC_PAIN_PRESENT: NO
ADL_SHORT_OF_BREATH: YES
ADL_SCORE: 12
ADL_BEFORE_ADMISSION: INDEPENDENT
ADL_BEFORE_ADMISSION: INDEPENDENT
ADL_SCORE: 12
ADL_SHORT_OF_BREATH: YES
RECENT_DECLINE_ADL: YES, DECLINE IN BATHING/DRESSING/FEEDING, COLLABORATE WITH PROVIDER (T)

## 2021-06-24 ASSESSMENT — ENCOUNTER SYMPTOMS
LIGHT-HEADEDNESS: 1
POLYDIPSIA: 1
DIAPHORESIS: 1
GASTROINTESTINAL NEGATIVE: 1
DIZZINESS: 1
PSYCHIATRIC NEGATIVE: 1
RESPIRATORY NEGATIVE: 1

## 2021-06-24 ASSESSMENT — PATIENT HEALTH QUESTIONNAIRE - PHQ9
SUM OF ALL RESPONSES TO PHQ9 QUESTIONS 1 AND 2: 0
IS PATIENT ABLE TO COMPLETE PHQ2 OR PHQ9: YES
2. FEELING DOWN, DEPRESSED OR HOPELESS: NOT AT ALL
CLINICAL INTERPRETATION OF PHQ2 SCORE: NO FURTHER SCREENING NEEDED
1. LITTLE INTEREST OR PLEASURE IN DOING THINGS: NOT AT ALL
CLINICAL INTERPRETATION OF PHQ9 SCORE: NO FURTHER SCREENING NEEDED
SUM OF ALL RESPONSES TO PHQ9 QUESTIONS 1 AND 2: 0

## 2021-06-24 ASSESSMENT — COGNITIVE AND FUNCTIONAL STATUS - GENERAL
DO YOU HAVE DIFFICULTY DRESSING OR BATHING: YES
ARE YOU DEAF OR DO YOU HAVE SERIOUS DIFFICULTY  HEARING: NO
ARE YOU BLIND OR DO YOU HAVE SERIOUS DIFFICULTY SEEING, EVEN WHEN WEARING GLASSES: NO
DO YOU HAVE SERIOUS DIFFICULTY WALKING OR CLIMBING STAIRS: YES
BECAUSE OF A PHYSICAL, MENTAL, OR EMOTIONAL CONDITION, DO YOU HAVE DIFFICULTY DOING ERRANDS ALONE: NO
BECAUSE OF A PHYSICAL, MENTAL, OR EMOTIONAL CONDITION, DO YOU HAVE SERIOUS DIFFICULTY CONCENTRATING, REMEMBERING OR MAKING DECISIONS: NO
ARE YOU BLIND OR DO YOU HAVE SERIOUS DIFFICULTY SEEING, EVEN WHEN WEARING GLASSES: YES

## 2021-06-24 ASSESSMENT — PAIN SCALES - GENERAL
PAINLEVEL_OUTOF10: 0
PAINLEVEL_OUTOF10: 8
PAINLEVEL_OUTOF10: 8
PAINLEVEL_OUTOF10: 7
PAINLEVEL_OUTOF10: 8

## 2021-06-24 ASSESSMENT — COLUMBIA-SUICIDE SEVERITY RATING SCALE - C-SSRS
6. HAVE YOU EVER DONE ANYTHING, STARTED TO DO ANYTHING, OR PREPARED TO DO ANYTHING TO END YOUR LIFE?: NO
IS THE PATIENT ABLE TO COMPLETE C-SSRS: YES
2. HAVE YOU ACTUALLY HAD ANY THOUGHTS OF KILLING YOURSELF?: NO
1. WITHIN THE PAST MONTH, HAVE YOU WISHED YOU WERE DEAD OR WISHED YOU COULD GO TO SLEEP AND NOT WAKE UP?: NO

## 2021-06-25 ENCOUNTER — APPOINTMENT (OUTPATIENT)
Dept: VASCULAR LAB | Age: 46
End: 2021-06-25
Attending: FAMILY MEDICINE

## 2021-06-25 ENCOUNTER — APPOINTMENT (OUTPATIENT)
Dept: CARDIOLOGY | Age: 46
End: 2021-06-25
Attending: FAMILY MEDICINE

## 2021-06-25 ENCOUNTER — APPOINTMENT (OUTPATIENT)
Dept: CARDIOLOGY | Age: 46
End: 2021-06-25
Attending: INTERNAL MEDICINE

## 2021-06-25 ENCOUNTER — APPOINTMENT (OUTPATIENT)
Dept: NUCLEAR MEDICINE | Age: 46
End: 2021-06-25
Attending: FAMILY MEDICINE

## 2021-06-25 VITALS
HEIGHT: 75 IN | RESPIRATION RATE: 16 BRPM | BODY MASS INDEX: 30.62 KG/M2 | TEMPERATURE: 98.1 F | DIASTOLIC BLOOD PRESSURE: 90 MMHG | HEART RATE: 75 BPM | SYSTOLIC BLOOD PRESSURE: 129 MMHG | WEIGHT: 246.25 LBS | OXYGEN SATURATION: 98 %

## 2021-06-25 LAB
ANION GAP SERPL CALC-SCNC: 13 MMOL/L (ref 10–20)
BASOPHILS # BLD: 0.1 K/MCL (ref 0–0.3)
BASOPHILS NFR BLD: 1 %
BUN SERPL-MCNC: 23 MG/DL (ref 6–20)
BUN/CREAT SERPL: 16 (ref 7–25)
CALCIUM SERPL-MCNC: 9.2 MG/DL (ref 8.4–10.2)
CHLORIDE SERPL-SCNC: 100 MMOL/L (ref 98–107)
CO2 SERPL-SCNC: 26 MMOL/L (ref 21–32)
CREAT SERPL-MCNC: 1.4 MG/DL (ref 0.67–1.17)
DEPRECATED RDW RBC: 40.6 FL (ref 39–50)
EOSINOPHIL # BLD: 0.2 K/MCL (ref 0–0.5)
EOSINOPHIL NFR BLD: 3 %
ERYTHROCYTE [DISTWIDTH] IN BLOOD: 13.1 % (ref 11–15)
FASTING DURATION TIME PATIENT: ABNORMAL H
GFR SERPLBLD BASED ON 1.73 SQ M-ARVRAT: 70 ML/MIN/1.73M2
GLUCOSE BLDC GLUCOMTR-MCNC: 254 MG/DL (ref 70–99)
GLUCOSE BLDC GLUCOMTR-MCNC: 283 MG/DL (ref 70–99)
GLUCOSE SERPL-MCNC: 371 MG/DL (ref 65–99)
HCT VFR BLD CALC: 34.6 % (ref 39–51)
HGB BLD-MCNC: 11.3 G/DL (ref 13–17)
IMM GRANULOCYTES # BLD AUTO: 0 K/MCL (ref 0–0.2)
IMM GRANULOCYTES # BLD: 1 %
LYMPHOCYTES # BLD: 2.5 K/MCL (ref 1–4.8)
LYMPHOCYTES NFR BLD: 35 %
MCH RBC QN AUTO: 27.6 PG (ref 26–34)
MCHC RBC AUTO-ENTMCNC: 32.7 G/DL (ref 32–36.5)
MCV RBC AUTO: 84.6 FL (ref 78–100)
MONOCYTES # BLD: 0.9 K/MCL (ref 0.3–0.9)
MONOCYTES NFR BLD: 12 %
NEUTROPHILS # BLD: 3.5 K/MCL (ref 1.8–7.7)
NEUTROPHILS NFR BLD: 48 %
NRBC BLD MANUAL-RTO: 0 /100 WBC
PLATELET # BLD AUTO: 308 K/MCL (ref 140–450)
POTASSIUM SERPL-SCNC: 4.4 MMOL/L (ref 3.4–5.1)
RBC # BLD: 4.09 MIL/MCL (ref 4.5–5.9)
SODIUM SERPL-SCNC: 135 MMOL/L (ref 135–145)
WBC # BLD: 7.1 K/MCL (ref 4.2–11)

## 2021-06-25 PROCEDURE — 78452 HT MUSCLE IMAGE SPECT MULT: CPT | Performed by: INTERNAL MEDICINE

## 2021-06-25 PROCEDURE — 80048 BASIC METABOLIC PNL TOTAL CA: CPT | Performed by: INTERNAL MEDICINE

## 2021-06-25 PROCEDURE — 36415 COLL VENOUS BLD VENIPUNCTURE: CPT | Performed by: INTERNAL MEDICINE

## 2021-06-25 PROCEDURE — G0378 HOSPITAL OBSERVATION PER HR: HCPCS

## 2021-06-25 PROCEDURE — 78452 HT MUSCLE IMAGE SPECT MULT: CPT

## 2021-06-25 PROCEDURE — 10002800 HB RX 250 W HCPCS: Performed by: HOSPITALIST

## 2021-06-25 PROCEDURE — 99215 OFFICE O/P EST HI 40 MIN: CPT | Performed by: FAMILY MEDICINE

## 2021-06-25 PROCEDURE — A9500 TC99M SESTAMIBI: HCPCS | Performed by: FAMILY MEDICINE

## 2021-06-25 PROCEDURE — 93017 CV STRESS TEST TRACING ONLY: CPT

## 2021-06-25 PROCEDURE — 99214 OFFICE O/P EST MOD 30 MIN: CPT | Performed by: INTERNAL MEDICINE

## 2021-06-25 PROCEDURE — 10002807 HB RX 258: Performed by: HOSPITALIST

## 2021-06-25 PROCEDURE — G1004 CDSM NDSC: HCPCS

## 2021-06-25 PROCEDURE — 10002800 HB RX 250 W HCPCS: Performed by: FAMILY MEDICINE

## 2021-06-25 PROCEDURE — 10006150 HB RX 343: Performed by: FAMILY MEDICINE

## 2021-06-25 PROCEDURE — 93270 REMOTE 30 DAY ECG REV/REPORT: CPT

## 2021-06-25 PROCEDURE — 10004651 HB RX, NO CHARGE ITEM: Performed by: HOSPITALIST

## 2021-06-25 PROCEDURE — 10002803 HB RX 637: Performed by: FAMILY MEDICINE

## 2021-06-25 PROCEDURE — 93018 CV STRESS TEST I&R ONLY: CPT | Performed by: INTERNAL MEDICINE

## 2021-06-25 PROCEDURE — 96372 THER/PROPH/DIAG INJ SC/IM: CPT

## 2021-06-25 PROCEDURE — G1004 CDSM NDSC: HCPCS | Performed by: INTERNAL MEDICINE

## 2021-06-25 PROCEDURE — 93970 EXTREMITY STUDY: CPT

## 2021-06-25 PROCEDURE — 93016 CV STRESS TEST SUPVJ ONLY: CPT | Performed by: INTERNAL MEDICINE

## 2021-06-25 PROCEDURE — 82962 GLUCOSE BLOOD TEST: CPT

## 2021-06-25 PROCEDURE — 85025 COMPLETE CBC W/AUTO DIFF WBC: CPT | Performed by: FAMILY MEDICINE

## 2021-06-25 PROCEDURE — 10002800 HB RX 250 W HCPCS: Performed by: INTERNAL MEDICINE

## 2021-06-25 PROCEDURE — 10002803 HB RX 637: Performed by: INTERNAL MEDICINE

## 2021-06-25 RX ORDER — INSULIN HUMAN 100 [IU]/ML
INJECTION, SUSPENSION SUBCUTANEOUS
Qty: 15 ML | Refills: 12 | Status: SHIPPED | OUTPATIENT
Start: 2021-06-25

## 2021-06-25 RX ORDER — INDAPAMIDE 1.25 MG/1
1.25 TABLET ORAL DAILY
Qty: 30 TABLET | Refills: 0 | Status: SHIPPED | OUTPATIENT
Start: 2021-06-26 | End: 2021-07-26

## 2021-06-25 RX ORDER — PRAVASTATIN SODIUM 40 MG
40 TABLET ORAL NIGHTLY
Status: DISCONTINUED | OUTPATIENT
Start: 2021-06-25 | End: 2021-06-25 | Stop reason: HOSPADM

## 2021-06-25 RX ORDER — TETRAKIS(2-METHOXYISOBUTYLISOCYANIDE)COPPER(I) TETRAFLUOROBORATE 1 MG/ML
11.8 INJECTION, POWDER, LYOPHILIZED, FOR SOLUTION INTRAVENOUS ONCE
Status: COMPLETED | OUTPATIENT
Start: 2021-06-25 | End: 2021-06-25

## 2021-06-25 RX ORDER — PRAVASTATIN SODIUM 40 MG
40 TABLET ORAL NIGHTLY
Qty: 30 TABLET | Refills: 0 | Status: SHIPPED | OUTPATIENT
Start: 2021-06-25 | End: 2021-07-25

## 2021-06-25 RX ORDER — VALSARTAN 160 MG/1
160 TABLET ORAL DAILY
Qty: 30 TABLET | Refills: 0 | Status: SHIPPED | OUTPATIENT
Start: 2021-06-26 | End: 2021-07-26

## 2021-06-25 RX ORDER — GABAPENTIN 300 MG/1
300 CAPSULE ORAL 3 TIMES DAILY
Qty: 90 CAPSULE | Refills: 0 | Status: SHIPPED | OUTPATIENT
Start: 2021-06-25 | End: 2021-07-25

## 2021-06-25 RX ORDER — TETRAKIS(2-METHOXYISOBUTYLISOCYANIDE)COPPER(I) TETRAFLUOROBORATE 1 MG/ML
30-34 INJECTION, POWDER, LYOPHILIZED, FOR SOLUTION INTRAVENOUS ONCE
Status: COMPLETED | OUTPATIENT
Start: 2021-06-25 | End: 2021-06-25

## 2021-06-25 RX ORDER — INDAPAMIDE 2.5 MG/1
1.25 TABLET ORAL DAILY
Status: DISCONTINUED | OUTPATIENT
Start: 2021-06-26 | End: 2021-06-25 | Stop reason: HOSPADM

## 2021-06-25 RX ORDER — REGADENOSON 0.08 MG/ML
0.4 INJECTION, SOLUTION INTRAVENOUS ONCE
Status: COMPLETED | OUTPATIENT
Start: 2021-06-25 | End: 2021-06-25

## 2021-06-25 RX ADMIN — TETRAKIS(2-METHOXYISOBUTYLISOCYANIDE)COPPER(I) TETRAFLUOROBORATE 34 MILLICURIE: 1 INJECTION, POWDER, LYOPHILIZED, FOR SOLUTION INTRAVENOUS at 11:07

## 2021-06-25 RX ADMIN — GABAPENTIN 300 MG: 300 CAPSULE ORAL at 13:23

## 2021-06-25 RX ADMIN — INSULIN LISPRO 40 UNITS: 100 INJECTION, SUSPENSION SUBCUTANEOUS at 13:23

## 2021-06-25 RX ADMIN — COLCHICINE 0.6 MG: 0.6 TABLET, FILM COATED ORAL at 13:23

## 2021-06-25 RX ADMIN — ALLOPURINOL 300 MG: 300 TABLET ORAL at 13:23

## 2021-06-25 RX ADMIN — TETRAKIS(2-METHOXYISOBUTYLISOCYANIDE)COPPER(I) TETRAFLUOROBORATE 11.8 MILLICURIE: 1 INJECTION, POWDER, LYOPHILIZED, FOR SOLUTION INTRAVENOUS at 08:40

## 2021-06-25 RX ADMIN — INSULIN LISPRO 6 UNITS: 100 INJECTION, SOLUTION INTRAVENOUS; SUBCUTANEOUS at 18:44

## 2021-06-25 RX ADMIN — HEPARIN SODIUM 5000 UNITS: 5000 INJECTION INTRAVENOUS; SUBCUTANEOUS at 06:03

## 2021-06-25 RX ADMIN — REGADENOSON 0.4 MG: 0.08 INJECTION, SOLUTION INTRAVENOUS at 11:01

## 2021-06-25 RX ADMIN — CARVEDILOL 25 MG: 25 TABLET, FILM COATED ORAL at 13:23

## 2021-06-25 RX ADMIN — SODIUM CHLORIDE: 0.9 INJECTION, SOLUTION INTRAVENOUS at 02:30

## 2021-06-25 RX ADMIN — INSULIN LISPRO 25 UNITS: 100 INJECTION, SUSPENSION SUBCUTANEOUS at 18:44

## 2021-06-25 RX ADMIN — SODIUM CHLORIDE, PRESERVATIVE FREE 2 ML: 5 INJECTION INTRAVENOUS at 13:22

## 2021-06-25 RX ADMIN — HEPARIN SODIUM 5000 UNITS: 5000 INJECTION INTRAVENOUS; SUBCUTANEOUS at 15:46

## 2021-06-25 RX ADMIN — VALSARTAN 160 MG: 160 TABLET, FILM COATED ORAL at 13:23

## 2021-06-25 ASSESSMENT — ENCOUNTER SYMPTOMS
FEVER: 0
DIZZINESS: 0
VOMITING: 0
SHORTNESS OF BREATH: 0

## 2021-06-25 ASSESSMENT — PAIN SCALES - GENERAL: PAINLEVEL_OUTOF10: 8

## 2021-07-29 PROCEDURE — 93272 ECG/REVIEW INTERPRET ONLY: CPT | Performed by: INTERNAL MEDICINE

## 2021-08-02 ENCOUNTER — TELEPHONE (OUTPATIENT)
Dept: CARDIOLOGY | Age: 46
End: 2021-08-02

## 2021-11-08 ENCOUNTER — APPOINTMENT (OUTPATIENT)
Dept: CT IMAGING | Age: 46
DRG: 372 | End: 2021-11-08
Attending: EMERGENCY MEDICINE
Payer: MEDICAID

## 2021-11-08 ENCOUNTER — HOSPITAL ENCOUNTER (INPATIENT)
Facility: HOSPITAL | Age: 46
LOS: 4 days | Discharge: HOME | DRG: 372 | End: 2021-11-12
Attending: EMERGENCY MEDICINE | Admitting: STUDENT IN AN ORGANIZED HEALTH CARE EDUCATION/TRAINING PROGRAM
Payer: MEDICAID

## 2021-11-08 ENCOUNTER — APPOINTMENT (OUTPATIENT)
Dept: GENERAL RADIOLOGY | Age: 46
DRG: 372 | End: 2021-11-08
Attending: EMERGENCY MEDICINE
Payer: MEDICAID

## 2021-11-08 DIAGNOSIS — E87.6 HYPOKALEMIA: ICD-10-CM

## 2021-11-08 DIAGNOSIS — N28.9 ACUTE RENAL INSUFFICIENCY: Primary | ICD-10-CM

## 2021-11-08 DIAGNOSIS — A04.72 CLOSTRIDIUM DIFFICILE COLITIS: ICD-10-CM

## 2021-11-08 PROCEDURE — 99223 1ST HOSP IP/OBS HIGH 75: CPT | Performed by: INTERNAL MEDICINE

## 2021-11-08 PROCEDURE — 74176 CT ABD & PELVIS W/O CONTRAST: CPT | Performed by: EMERGENCY MEDICINE

## 2021-11-08 PROCEDURE — 71045 X-RAY EXAM CHEST 1 VIEW: CPT | Performed by: EMERGENCY MEDICINE

## 2021-11-08 RX ORDER — METRONIDAZOLE 500 MG/100ML
500 INJECTION, SOLUTION INTRAVENOUS ONCE
Status: COMPLETED | OUTPATIENT
Start: 2021-11-08 | End: 2021-11-08

## 2021-11-08 RX ORDER — SODIUM CHLORIDE 9 MG/ML
INJECTION, SOLUTION INTRAVENOUS CONTINUOUS
Status: DISCONTINUED | OUTPATIENT
Start: 2021-11-09 | End: 2021-11-11

## 2021-11-08 RX ORDER — GLIPIZIDE 10 MG/1
10 TABLET ORAL
COMMUNITY

## 2021-11-08 RX ORDER — PROCHLORPERAZINE EDISYLATE 5 MG/ML
5 INJECTION INTRAMUSCULAR; INTRAVENOUS EVERY 8 HOURS PRN
Status: DISCONTINUED | OUTPATIENT
Start: 2021-11-08 | End: 2021-11-12

## 2021-11-08 RX ORDER — VANCOMYCIN HYDROCHLORIDE 125 MG/1
125 CAPSULE ORAL EVERY 6 HOURS
Status: DISCONTINUED | OUTPATIENT
Start: 2021-11-09 | End: 2021-11-12

## 2021-11-08 RX ORDER — CYCLOBENZAPRINE HCL 10 MG
10 TABLET ORAL 3 TIMES DAILY PRN
Status: DISCONTINUED | OUTPATIENT
Start: 2021-11-08 | End: 2021-11-11

## 2021-11-08 RX ORDER — INSULIN LISPRO 100 [IU]/ML
INJECTION, SOLUTION INTRAVENOUS; SUBCUTANEOUS
COMMUNITY

## 2021-11-08 RX ORDER — HEPARIN SODIUM 5000 [USP'U]/ML
5000 INJECTION, SOLUTION INTRAVENOUS; SUBCUTANEOUS EVERY 8 HOURS SCHEDULED
Status: DISCONTINUED | OUTPATIENT
Start: 2021-11-09 | End: 2021-11-08

## 2021-11-08 RX ORDER — GABAPENTIN 300 MG/1
300 CAPSULE ORAL 2 TIMES DAILY
COMMUNITY

## 2021-11-08 RX ORDER — CARVEDILOL 25 MG/1
25 TABLET ORAL 2 TIMES DAILY WITH MEALS
COMMUNITY

## 2021-11-08 RX ORDER — ONDANSETRON 2 MG/ML
4 INJECTION INTRAMUSCULAR; INTRAVENOUS ONCE
Status: COMPLETED | OUTPATIENT
Start: 2021-11-08 | End: 2021-11-08

## 2021-11-08 RX ORDER — POTASSIUM CHLORIDE 14.9 MG/ML
20 INJECTION INTRAVENOUS ONCE
Status: COMPLETED | OUTPATIENT
Start: 2021-11-08 | End: 2021-11-08

## 2021-11-08 RX ORDER — HEPARIN SODIUM 5000 [USP'U]/ML
5000 INJECTION, SOLUTION INTRAVENOUS; SUBCUTANEOUS EVERY 8 HOURS SCHEDULED
Status: DISCONTINUED | OUTPATIENT
Start: 2021-11-09 | End: 2021-11-12

## 2021-11-08 RX ORDER — MORPHINE SULFATE 4 MG/ML
4 INJECTION, SOLUTION INTRAMUSCULAR; INTRAVENOUS ONCE
Status: COMPLETED | OUTPATIENT
Start: 2021-11-08 | End: 2021-11-08

## 2021-11-08 RX ORDER — ACETAMINOPHEN 325 MG/1
650 TABLET ORAL EVERY 6 HOURS PRN
Status: DISCONTINUED | OUTPATIENT
Start: 2021-11-08 | End: 2021-11-12

## 2021-11-08 RX ORDER — HYDRALAZINE HYDROCHLORIDE 20 MG/ML
10 INJECTION INTRAMUSCULAR; INTRAVENOUS EVERY 4 HOURS PRN
Status: DISCONTINUED | OUTPATIENT
Start: 2021-11-08 | End: 2021-11-12

## 2021-11-08 RX ORDER — CARVEDILOL 12.5 MG/1
25 TABLET ORAL 2 TIMES DAILY WITH MEALS
Status: DISCONTINUED | OUTPATIENT
Start: 2021-11-09 | End: 2021-11-12

## 2021-11-08 RX ORDER — ALLOPURINOL 300 MG/1
300 TABLET ORAL DAILY
COMMUNITY

## 2021-11-08 RX ORDER — ONDANSETRON 2 MG/ML
4 INJECTION INTRAMUSCULAR; INTRAVENOUS EVERY 6 HOURS PRN
Status: DISCONTINUED | OUTPATIENT
Start: 2021-11-08 | End: 2021-11-12

## 2021-11-08 RX ORDER — CYCLOBENZAPRINE HCL 10 MG
10 TABLET ORAL 3 TIMES DAILY PRN
COMMUNITY

## 2021-11-08 RX ORDER — DEXTROSE MONOHYDRATE 25 G/50ML
50 INJECTION, SOLUTION INTRAVENOUS
Status: DISCONTINUED | OUTPATIENT
Start: 2021-11-08 | End: 2021-11-12

## 2021-11-08 RX ORDER — POTASSIUM CHLORIDE 20 MEQ/1
40 TABLET, EXTENDED RELEASE ORAL ONCE
Status: COMPLETED | OUTPATIENT
Start: 2021-11-08 | End: 2021-11-08

## 2021-11-08 RX ORDER — FUROSEMIDE 20 MG/1
40 TABLET ORAL DAILY
COMMUNITY
End: 2021-11-12

## 2021-11-08 RX ORDER — GABAPENTIN 300 MG/1
300 CAPSULE ORAL DAILY
Status: DISCONTINUED | OUTPATIENT
Start: 2021-11-09 | End: 2021-11-12

## 2021-11-09 PROBLEM — E11.9 DIABETES MELLITUS TYPE 2 IN NONOBESE (HCC): Chronic | Status: ACTIVE | Noted: 2021-11-09

## 2021-11-09 PROBLEM — N17.9 AKI (ACUTE KIDNEY INJURY) (HCC): Status: ACTIVE | Noted: 2021-11-09

## 2021-11-09 PROBLEM — N17.9 AKI (ACUTE KIDNEY INJURY): Status: ACTIVE | Noted: 2021-11-09

## 2021-11-09 PROBLEM — I10 PRIMARY HYPERTENSION: Chronic | Status: ACTIVE | Noted: 2021-11-09

## 2021-11-09 PROCEDURE — 99232 SBSQ HOSP IP/OBS MODERATE 35: CPT | Performed by: INTERNAL MEDICINE

## 2021-11-09 RX ORDER — MORPHINE SULFATE 2 MG/ML
0.5 INJECTION, SOLUTION INTRAMUSCULAR; INTRAVENOUS EVERY 2 HOUR PRN
Status: DISCONTINUED | OUTPATIENT
Start: 2021-11-09 | End: 2021-11-12

## 2021-11-09 RX ORDER — POTASSIUM CHLORIDE 20 MEQ/1
40 TABLET, EXTENDED RELEASE ORAL EVERY 4 HOURS
Status: COMPLETED | OUTPATIENT
Start: 2021-11-09 | End: 2021-11-09

## 2021-11-09 RX ORDER — MORPHINE SULFATE 2 MG/ML
2 INJECTION, SOLUTION INTRAMUSCULAR; INTRAVENOUS EVERY 2 HOUR PRN
Status: DISCONTINUED | OUTPATIENT
Start: 2021-11-09 | End: 2021-11-12

## 2021-11-09 RX ORDER — MORPHINE SULFATE 2 MG/ML
1 INJECTION, SOLUTION INTRAMUSCULAR; INTRAVENOUS EVERY 2 HOUR PRN
Status: DISCONTINUED | OUTPATIENT
Start: 2021-11-09 | End: 2021-11-12

## 2021-11-09 RX ORDER — POTASSIUM CHLORIDE 20 MEQ/1
40 TABLET, EXTENDED RELEASE ORAL EVERY 4 HOURS
Status: COMPLETED | OUTPATIENT
Start: 2021-11-09 | End: 2021-11-10

## 2021-11-09 NOTE — PROGRESS NOTES
[facility]  Progress Note    [facility] Patient Status:  Inpatient    1975 MRN [id number]   [facility] 4NW-A Attending Boom Enamorado MD   Hosp Day # 1 PCP Unknown Pcp     CC: Diarrhea    SUBJECTIVE:  Complains of intractabl ALKPHO 84 11/08/2021    BILT 0.3 11/08/2021    TP 8.6 11/08/2021    AST 33 11/08/2021    ALT 56 11/08/2021    TROP <0.045 11/08/2021    PGLU 257 11/09/2021       Imaging:  XR CHEST AP PORTABLE  (CPT=71045)       TECHNIQUE:  AP chest radiograph was obtained UNIT/ML injection 5,000 Units, 5,000 Units, Subcutaneous, Q8H EMMA  acetaminophen (TYLENOL) tab 650 mg, 650 mg, Oral, Q6H PRN  ondansetron (ZOFRAN) injection 4 mg, 4 mg, Intravenous, Q6H PRN  Prochlorperazine Edisylate (COMPAZINE) injection 5 mg, 5 mg, Intr

## 2021-11-09 NOTE — H&P
[facility]  History and Physical     Marcelle Gutierrez Patient Status:  Emergency    1975 MRN [id number]   [facility] [place] Attending Adela Morales, MD   Hosp Day # 0 PCP Unknown Pcp     Chief Complaint: Lispro 100 UNIT/ML Subcutaneous Solution, Inject into the skin 3 (three) times daily before meals. , Disp: , Rfl:         Review of Systems:   A comprehensive 14 point review of systems was completed. Pertinent positives and negatives noted in the HPI. input(s): CK in the last 168 hours. Inflammatory Markers  No results for input(s): CRP, MABEL, LDH, DDIMER in the last 168 hours. Imaging: Imaging data reviewed in Epic. ASSESSMENT / PLAN:     #C. Diff Diarrhea  -continue oral vancomycin  -supporti

## 2021-11-09 NOTE — ED PROVIDER NOTES
Patient Seen in: THE [facility] Emergency Department In [place]      History   Patient presents with:  Abdomen/Flank Pain  Difficulty Breathing    Stated Complaint: lower back pain and diarrhea    Subjective:   HPI    The patient is a 44-year-old male with hi 99 %   O2 Device 11/08/21 1722 None (Room air)       Current:BP (!) 149/98   Pulse 96   Temp 98.1 °F (36.7 °C) (Temporal)   Resp 18   Ht 193 cm (6' 4\")   Wt 108.9 kg   SpO2 98%   BMI 29.21 kg/m²         Physical Exam    General: Comfortable and well appea for the following components:    MCV 77.6 (*)     Monocyte Absolute 1.01 (*)     All other components within normal limits   TROPONIN I - Normal   PRO BETA NATRIURETIC PEPTIDE - Normal   OCCULT BLOOD, STOOL - Normal   RAPID SARS-COV-2 BY PCR - Normal   CBC He was given IV normal saline. He was significantly hypokalemic, this was repleted with IV and oral potassium. MDM        CT ABDOMEN+PELVIS(CPT=74176)   Final Result    PROCEDURE:  CT ABDOMEN+PELVIS (CPT=74176)         COMPARISON:  None.          I vessels. The hernia sac measures     2.0 x 2.0 x 2.9 cm. There is a loop of small bowel which approximates but     does not enter the hernia sac. URINARY BLADDER:  No visible focal wall thickening, lesion, or calculus.       PELVIC NODES:  No adenopa =====    CONCLUSION:  Mild elevation of the right hemidiaphragm. No acute     intrathoracic abnormality identified.                    Dictated by (CST): Toño Gilbert MD on 11/08/2021 at 6:56 PM         Finalized by (CST): Toño Gilbert

## 2021-11-09 NOTE — PROGRESS NOTES
NURSING ADMISSION NOTE      Patient admitted via ambulence  Oriented to room. Safety precautions initiated. Bed in low position. Call light in reach. MD at bedside. Med rec completed.  Pt complains of severe abdominal cramping pain, IV medications

## 2021-11-09 NOTE — PLAN OF CARE
Alert and oriented, still experiencing diarrhea, had total of 6 stools this shift, on Vancomycin for C dif. Has on and off mild abdominal cramping, denies nausea, tolerating diet well. K level was low , replaced per protocol. Up ad valentina in the room.    Probl

## 2021-11-10 PROCEDURE — 99232 SBSQ HOSP IP/OBS MODERATE 35: CPT | Performed by: INTERNAL MEDICINE

## 2021-11-10 RX ORDER — POTASSIUM CHLORIDE 20 MEQ/1
40 TABLET, EXTENDED RELEASE ORAL EVERY 4 HOURS
Status: COMPLETED | OUTPATIENT
Start: 2021-11-10 | End: 2021-11-10

## 2021-11-10 NOTE — PROGRESS NOTES
[facility]  Progress Note    Carey Rider Patient Status:  Inpatient    1975 MRN [id number]   [facility] 4NW-A Attending Elmer Saravia MD   Hosp Day # 2 PCP Unknown Pcp     CC: Diarrhea    SUBJECTIVE:  sam Faulkner across the center of his chest, shortness of breath, diarrhea and bilateral flank pain.  He denies any known fever. [facility] has a history of CHF.             FINDINGS:  Heart size and vascularity are normal.  Lung fields are clear.  No diaphragmatic or pleural HCl (APRESOLINE) injection 10 mg, 10 mg, Intravenous, Q4H PRN        ASSESSMENT / PLAN:     1. Acute C. difficile colitis with intractable diarrhea and abdominal pain  1. Oral vancomycin  2. IV fluids  3. Pain medications as needed  2.  Acute kidney injury

## 2021-11-10 NOTE — PAYOR COMM NOTE
--------------  ADMISSION REVIEW     Payor: Primo Ferris #:  [id number]  Authorization Number: PENDING    Admit date: 11/8/21  Admit time: 11:27 PM       REVIEW DOCUMENTATION:     ED Provider Notes      ED Provider Notes signed by Idania Alcaraz Drug use: Not on file             Review of Systems    Positive for stated complaint: lower back pain and diarrhea  Other systems are as noted in HPI. Constitutional and vital signs reviewed.       All other systems reviewed and negative except as noted a Sodium 135 (*)     Potassium 2.7 (*)     CO2 20.0 (*)     BUN 26 (*)     Creatinine 1.73 (*)     Calculated Osmolality 296 (*)     GFR, Non- 47 (*)     GFR, -American 54 (*)     Total Protein 8.6 (*)     All other components within n Rhythm  Reading: Normal sinus rhythm without any ectopy, normal axis, normal normals, no ST segment changes or pathologic T wave inversions. There are Q waves in leads V1 and V2. There is no previous EKG available for comparison.               On arrival BOWEL/MESENTERY:  There is fluid density seen throughout the colon     consistent with history of diarrhea. There is also fluid density seen     throughout small bowel. There is no evidence for mechanical bowel     obstruction.   Tiny appendicoliths are n week, patient complains of pain across the center of his chest, shortness     of breath, diarrhea and bilateral flank pain. He denies any known fever. He has a history of CHF.                FINDINGS:  Heart size and vascularity are normal.  Lung Azam Salmeron MD Service: Cha Alfred Author Type: Physician    Filed: 11/8/2021 11:55 PM Date of Service: 11/8/2021 10:27 PM Status: Signed    : Azam Salmeron MD (Physician)           [facility]  History and Physical     Chad Neville daily before meals. , Disp: , Rfl:   Insulin NPH & Regular 70/30 (70-30) 100 UNIT/ML Subcutaneous Suspension, Inject into the skin 2 (two) times daily before meals. , Disp: , Rfl:   Insulin Lispro 100 UNIT/ML Subcutaneous Solution, Inject into the skin 3 (th Pro-Calcitonin  No results for input(s): PCT in the last 168 hours. Cardiac  Recent Labs   Lab 11/08/21  1826   TROP <0.045   PBNP 40       Creatinine Kinase  No results for input(s): CK in the last 168 hours.     Inflammatory Markers  No results f mg     Date Action Dose Route User    11/10/2021 0139 Given 10 mg Intravenous Braden Flores RN      Insulin Aspart Pen (NOVOLOG) 100 UNIT/ML flexpen 1-10 Units     Date Action Dose Route User    11/10/2021 1145 Given 3 Units Subcutaneous (Right Upper Arm — MM    11/09/21 2200 98.3 °F (36.8 °C) 64 16 150/97 96 % — — — MM    11/09/21 1730 98.4 °F (36.9 °C) 73 18 152/99 98 % — None (Room air) — AR    11/09/21 0730 98.4 °F (36.9 °C) 85 18 149/98 98 % — None (Room air) — AR    11/09/21 0532 97.9 °F (36.6 °C) 86 vomiting.     OBJECTIVE:     Intake/Output:     Intake/Output Summary (Last 24 hours) at 11/10/2021 0858  Last data filed at 11/10/2021 0600      Gross per 24 hour   Intake 1920 ml   Output —   Net 1920 ml      Labs:         Lab Results   Component Value D

## 2021-11-10 NOTE — DIETARY NOTE
25 Ano Camille Sy admitted on 11/8 presents with diarrhea, found to be +c diff. PMH:  has a past medical history of Congestive heart disease ([facility] [address] Diabetes ([facility] [address] and Essential hypertension.      Admitting diagnosi

## 2021-11-10 NOTE — PLAN OF CARE
Patient received alert and oriented x 4, lungs clear on room air, abdomen soft, bowel sounds present, 2 loose BM's this evening, voiding adequate amounts, asymptomatic, complains of minimal right sided, intermittent cramping, denies need for analgesics, me

## 2021-11-11 PROCEDURE — 99232 SBSQ HOSP IP/OBS MODERATE 35: CPT | Performed by: INTERNAL MEDICINE

## 2021-11-11 RX ORDER — AMLODIPINE BESYLATE 5 MG/1
5 TABLET ORAL DAILY
Qty: 30 TABLET | Refills: 0 | Status: SHIPPED | OUTPATIENT
Start: 2021-11-11 | End: 2021-11-12

## 2021-11-11 RX ORDER — VANCOMYCIN HYDROCHLORIDE 125 MG/1
125 CAPSULE ORAL EVERY 6 HOURS
Qty: 42 CAPSULE | Refills: 0 | Status: SHIPPED | OUTPATIENT
Start: 2021-11-11 | End: 2021-11-22

## 2021-11-11 RX ORDER — AMLODIPINE BESYLATE 5 MG/1
5 TABLET ORAL DAILY
Status: DISCONTINUED | OUTPATIENT
Start: 2021-11-11 | End: 2021-11-12

## 2021-11-11 RX ORDER — DIAZEPAM 5 MG/1
5 TABLET ORAL EVERY 12 HOURS PRN
Status: DISCONTINUED | OUTPATIENT
Start: 2021-11-11 | End: 2021-11-12

## 2021-11-11 RX ORDER — CYCLOBENZAPRINE HCL 10 MG
10 TABLET ORAL 3 TIMES DAILY
Status: DISCONTINUED | OUTPATIENT
Start: 2021-11-11 | End: 2021-11-12

## 2021-11-11 NOTE — PROGRESS NOTES
Small loose stool x3 this shift. On po vanco. Contact plus isolation. Up in room independently.  Blld sugars monitored before meals and at bedtime and corrected per sliding scale

## 2021-11-11 NOTE — CM/SW NOTE
MSW, Charge Rn and RN discussed patient's post d/c needs in care rounds. No identified needs at this time, MSW will remain available should needs change.     Plan of Care/Barriers to discharge:  Pain from back spasm  [address]:  DC friday

## 2021-11-11 NOTE — CM/SW NOTE
1:52pm   Rn updated MSW that Script was sent. MSW called Pharmacy [phone]; Pharmacy states script is ready. 1:39pm  MSW spoke to bedside Rn, MSW asked her to send in [facility] for Oral Vanco then MSW can check cost. Rn states she will call MSW back.

## 2021-11-11 NOTE — PLAN OF CARE
Afebrile, tolerating diet well, has had one episode of stools, states less loose, experiencing low back spasms and pain, on Flexeril, and started on Lidoderm patch. Up ad valentina in the room, voiding well.    Problem: GASTROINTESTINAL - ADULT  Goal: Maintains o

## 2021-11-11 NOTE — PROGRESS NOTES
[facility]  Progress Note    Zain Simpler Patient Status:  Inpatient    1975 MRN [id number]   [facility] 4NW-A Attending Stanley Fleischer, MD   Hosp Day # 3 PCP Unknown Pcp     CC: Diarrhea    SUBJECTIVE:  Diarrhea improving, had Date    CREATSERUM 0.99 11/11/2021    BUN 12 11/11/2021     11/11/2021    K 3.3 11/11/2021     11/11/2021    CO2 21.0 11/11/2021     11/11/2021    CA 8.6 11/11/2021    PGLU 193 11/11/2021       Imaging:  XR CHEST AP PORTABLE  (CPT=71045) Intravenous, Continuous  Heparin Sodium (Porcine) 5000 UNIT/ML injection 5,000 Units, 5,000 Units, Subcutaneous, Q8H [facility]  acetaminophen (TYLENOL) tab 650 mg, 650 mg, Oral, Q6H PRN  ondansetron (ZOFRAN) injection 4 mg, 4 mg, Intravenous, Q6H PRN  Prochlorpe patient and RN  Called patient requested that I call  his aunt whom he stays with and update regarding him, called patient's aunt Luca Flannery at [phone] and updated in detail      Seth Ross MD  11/11/2021

## 2021-11-11 NOTE — PROGRESS NOTES
Alert, orientated x4. Complaining of back pain-given flexeril then morphine. Took tylenol early am for pian. Reports minimal bowel movements and increased urination. Iv infusing at 100cc/hr. Afebrile.

## 2021-11-12 VITALS
TEMPERATURE: 99 F | WEIGHT: 252.19 LBS | SYSTOLIC BLOOD PRESSURE: 177 MMHG | DIASTOLIC BLOOD PRESSURE: 106 MMHG | RESPIRATION RATE: 18 BRPM | HEART RATE: 81 BPM | BODY MASS INDEX: 30.71 KG/M2 | HEIGHT: 76 IN | OXYGEN SATURATION: 97 %

## 2021-11-12 PROCEDURE — 99239 HOSP IP/OBS DSCHRG MGMT >30: CPT | Performed by: INTERNAL MEDICINE

## 2021-11-12 RX ORDER — AMLODIPINE BESYLATE 10 MG/1
10 TABLET ORAL DAILY
Qty: 30 TABLET | Refills: 0 | Status: SHIPPED | OUTPATIENT
Start: 2021-11-12 | End: 2021-12-12

## 2021-11-12 NOTE — PLAN OF CARE
Pt is A&Ox4. Pt reports decreased frequency in stools. Denies abdominal pain and cramping. Pt with low back pain. Meds per MAR. Elevated BS, insulin per MAR. Pt SBP remains around 170s. IV Hydralazine given without much effect. MD notified.  No new orders,

## 2021-11-12 NOTE — DISCHARGE SUMMARY
[facility]  Discharge Summary    Gerald Sy Patient Status:  Inpatient    1975 MRN [id number]   [facility] 4NW-A Attending No att. providers found   [facility] Day # 4 PCP Unknown Pcp     Date of Admission: 2021    Date of Leopold Martens admission which was treated with IV fluids and kidney function test improving and came back to normal at the time of discharge. IV fluids stopped on 11/11/2021.   Patient had hyponatremia due to dehydration due to intractable diarrhea on admission which im vancomycin for treatment of C. difficile   Stop taking on: November 22, 2021  Quantity: 42 capsule  Refills: 0        CONTINUE taking these medications      Instructions Prescription details   allopurinol 300 MG Tabs  Commonly known as: ZYLOPRIM      Take with blood pressure log at home    Cyndi Porfirio  [facility] [place] [address] [address] [facility]    Schedule an appointment as soon as possible for a visit in 1 week      Appointments for Next 30 Days 11/12/2021 - 12/12/2021

## 2021-11-12 NOTE — CM/SW NOTE
11/12/21 1400   CM/SW Referral Data   Referral Source Social Work (self-referral)   Reason for Referral Discharge planning   Patient Info   Patient's Current Mental Status at Time of Assessment Alert;Oriented   Patient's [address]   Patient

## 2021-11-12 NOTE — PROGRESS NOTES
[facility]  Progress Note    She Perry Patient Status:  Inpatient    1975 MRN [id number]   [facility] 4NW-A Attending Young Patel MD   Hosp Day # 4 PCP Unknown Pcp     CC: Diarrhea    SUBJECTIVE:  no more diarrhea , stoo COMPARISON:  None.       INDICATIONS:  lower back pain and diarrhea       PATIENT STATED HISTORY: (As transcribed by Technologist)  Over the past 1 week, patient complains of pain across the center of his chest, shortness of breath, diarrhea and bilatera injection 4 mg, 4 mg, Intravenous, Q6H PRN  Prochlorperazine Edisylate (COMPAZINE) injection 5 mg, 5 mg, Intravenous, Q8H PRN  Insulin Aspart Pen (NOVOLOG) 100 UNIT/ML flexpen 1-10 Units, 1-10 Units, Subcutaneous, TID AC and HS  vancomycin (VANCOCIN) cap 1

## 2021-11-15 ENCOUNTER — PATIENT OUTREACH (OUTPATIENT)
Dept: CASE MANAGEMENT | Age: 46
End: 2021-11-15

## 2021-11-16 ENCOUNTER — PATIENT OUTREACH (OUTPATIENT)
Dept: CASE MANAGEMENT | Age: 46
End: 2021-11-16

## 2021-11-16 NOTE — PAYOR COMM NOTE
PLEASE FAX DETERMINATION.    ADMIT REVIEW WAS SUBMITTED ON 11/10    DISCHARGE REVIEW    Payor: Yumiko Faustin #:  [id number]  Authorization Number: PENDING    Admit date: 11/8/21  Admit time:  11:27 PM  Discharge Date: 11/12/2021  4:48 PM     Admitting Dr. Kitchen for details of admission    Brief Synopsis: Patient had diffuse abdominal pain and intractable diarrhea on admission. CT of the abdomen and pelvis done on admission on 11/8/2021 showed  CONCLUSION:     1.  Fluid density seen within the sto the hospital.      PCP: Unknown Pcp      Procedures during hospitalization:   • none    Incidental or significant findings and recommendations (brief descriptions):  • none    Lab/Test results pending at Discharge:   · none      Discharge Medications: medications were sent to [facility] [address] [place] [address], [phone], [address], [facility] [address]    Hours: 24-hours Phone: [phone]   · amLODIPine 5 MG Tabs  · vancomycin 125 M

## 2021-11-16 NOTE — PROGRESS NOTES
1ST ATTEMPT AT [facility]  [address] [facility] [address] [place] [phone]    Unable to contact patient     Left voice mail     Will try again

## 2021-11-17 NOTE — PROGRESS NOTES
Gema Young  [address] [facility] [address] Replaced by [facility] [phone]    Unable to contact patient     Left voice mail     Will try again

## 2021-11-18 NOTE — PROGRESS NOTES
3rd ATTEMPT AT [facility]  Meng [address] [phone]    Unable to contact patient     Left voice mail

## 2022-10-01 ENCOUNTER — APPOINTMENT (OUTPATIENT)
Dept: ULTRASOUND IMAGING | Age: 47
End: 2022-10-01
Attending: EMERGENCY MEDICINE
Payer: MEDICAID

## 2022-10-01 ENCOUNTER — HOSPITAL ENCOUNTER (EMERGENCY)
Age: 47
Discharge: HOME | End: 2022-10-02
Attending: EMERGENCY MEDICINE
Payer: MEDICAID

## 2022-10-01 ENCOUNTER — APPOINTMENT (OUTPATIENT)
Dept: GENERAL RADIOLOGY | Age: 47
End: 2022-10-01
Attending: EMERGENCY MEDICINE
Payer: MEDICAID

## 2022-10-01 DIAGNOSIS — R73.9 HYPERGLYCEMIA: ICD-10-CM

## 2022-10-01 DIAGNOSIS — M79.89 LEFT ARM SWELLING: Primary | ICD-10-CM

## 2022-10-01 LAB
ALBUMIN SERPL-MCNC: 3.5 G/DL (ref 3.4–5)
ALBUMIN/GLOB SERPL: 1 {RATIO} (ref 1–2)
ALP LIVER SERPL-CCNC: 52 U/L
ALT SERPL-CCNC: 30 U/L
ANION GAP SERPL CALC-SCNC: 10 MMOL/L (ref 0–18)
AST SERPL-CCNC: 12 U/L (ref 15–37)
BASOPHILS # BLD AUTO: 0.05 X10(3) UL (ref 0–0.2)
BASOPHILS NFR BLD AUTO: 0.5 %
BILIRUB SERPL-MCNC: 0.4 MG/DL (ref 0.1–2)
BUN BLD-MCNC: 13 MG/DL (ref 7–18)
CALCIUM BLD-MCNC: 8.1 MG/DL (ref 8.5–10.1)
CHLORIDE SERPL-SCNC: 101 MMOL/L (ref 98–112)
CO2 SERPL-SCNC: 24 MMOL/L (ref 21–32)
CREAT BLD-MCNC: 1.65 MG/DL
EOSINOPHIL # BLD AUTO: 0.16 X10(3) UL (ref 0–0.7)
EOSINOPHIL NFR BLD AUTO: 1.7 %
ERYTHROCYTE [DISTWIDTH] IN BLOOD BY AUTOMATED COUNT: 14.6 %
GFR SERPLBLD BASED ON 1.73 SQ M-ARVRAT: 52 ML/MIN/1.73M2 (ref 60–?)
GLOBULIN PLAS-MCNC: 3.4 G/DL (ref 2.8–4.4)
GLUCOSE BLD-MCNC: 415 MG/DL (ref 70–99)
HCT VFR BLD AUTO: 35.4 %
HGB BLD-MCNC: 11.9 G/DL
IMM GRANULOCYTES # BLD AUTO: 0.07 X10(3) UL (ref 0–1)
IMM GRANULOCYTES NFR BLD: 0.8 %
LYMPHOCYTES # BLD AUTO: 2.41 X10(3) UL (ref 1–4)
LYMPHOCYTES NFR BLD AUTO: 25.9 %
MCH RBC QN AUTO: 27.5 PG (ref 26–34)
MCHC RBC AUTO-ENTMCNC: 33.6 G/DL (ref 31–37)
MCV RBC AUTO: 81.8 FL
MONOCYTES # BLD AUTO: 0.96 X10(3) UL (ref 0.1–1)
MONOCYTES NFR BLD AUTO: 10.3 %
NEUTROPHILS # BLD AUTO: 5.66 X10 (3) UL (ref 1.5–7.7)
NEUTROPHILS # BLD AUTO: 5.66 X10(3) UL (ref 1.5–7.7)
NEUTROPHILS NFR BLD AUTO: 60.8 %
OSMOLALITY SERPL CALC.SUM OF ELEC: 298 MOSM/KG (ref 275–295)
PLATELET # BLD AUTO: 241 10(3)UL (ref 150–450)
POTASSIUM SERPL-SCNC: 3.2 MMOL/L (ref 3.5–5.1)
PROT SERPL-MCNC: 6.9 G/DL (ref 6.4–8.2)
RBC # BLD AUTO: 4.33 X10(6)UL
SODIUM SERPL-SCNC: 135 MMOL/L (ref 136–145)
WBC # BLD AUTO: 9.3 X10(3) UL (ref 4–11)

## 2022-10-01 PROCEDURE — 99285 EMERGENCY DEPT VISIT HI MDM: CPT

## 2022-10-01 PROCEDURE — 96361 HYDRATE IV INFUSION ADD-ON: CPT

## 2022-10-01 PROCEDURE — 80053 COMPREHEN METABOLIC PANEL: CPT | Performed by: EMERGENCY MEDICINE

## 2022-10-01 PROCEDURE — 85025 COMPLETE CBC W/AUTO DIFF WBC: CPT | Performed by: EMERGENCY MEDICINE

## 2022-10-01 PROCEDURE — 73110 X-RAY EXAM OF WRIST: CPT | Performed by: EMERGENCY MEDICINE

## 2022-10-01 PROCEDURE — 96374 THER/PROPH/DIAG INJ IV PUSH: CPT

## 2022-10-01 PROCEDURE — 93971 EXTREMITY STUDY: CPT | Performed by: EMERGENCY MEDICINE

## 2022-10-01 PROCEDURE — 84550 ASSAY OF BLOOD/URIC ACID: CPT | Performed by: EMERGENCY MEDICINE

## 2022-10-01 PROCEDURE — 99284 EMERGENCY DEPT VISIT MOD MDM: CPT

## 2022-10-01 RX ORDER — POTASSIUM CHLORIDE 20 MEQ/1
40 TABLET, EXTENDED RELEASE ORAL ONCE
Status: COMPLETED | OUTPATIENT
Start: 2022-10-01 | End: 2022-10-02

## 2022-10-01 RX ORDER — KETOROLAC TROMETHAMINE 30 MG/ML
30 INJECTION, SOLUTION INTRAMUSCULAR; INTRAVENOUS ONCE
Status: COMPLETED | OUTPATIENT
Start: 2022-10-01 | End: 2022-10-01

## 2022-10-01 RX ORDER — INSULIN ASPART 100 [IU]/ML
0.2 INJECTION, SOLUTION INTRAVENOUS; SUBCUTANEOUS ONCE
Status: COMPLETED | OUTPATIENT
Start: 2022-10-01 | End: 2022-10-02

## 2022-10-02 VITALS
HEART RATE: 90 BPM | RESPIRATION RATE: 18 BRPM | BODY MASS INDEX: 32 KG/M2 | DIASTOLIC BLOOD PRESSURE: 95 MMHG | WEIGHT: 265 LBS | TEMPERATURE: 98 F | SYSTOLIC BLOOD PRESSURE: 168 MMHG | OXYGEN SATURATION: 99 %

## 2022-10-02 LAB
GLUCOSE BLD-MCNC: 295 MG/DL (ref 70–99)
URATE SERPL-MCNC: 9.4 MG/DL

## 2022-10-02 PROCEDURE — 82962 GLUCOSE BLOOD TEST: CPT

## 2022-10-02 RX ORDER — CEPHALEXIN 500 MG/1
500 CAPSULE ORAL 4 TIMES DAILY
Qty: 40 CAPSULE | Refills: 0 | Status: SHIPPED | OUTPATIENT
Start: 2022-10-02 | End: 2022-10-12

## 2022-10-02 RX ORDER — ALLOPURINOL 100 MG/1
100 TABLET ORAL DAILY
Qty: 30 TABLET | Refills: 0 | Status: SHIPPED | OUTPATIENT
Start: 2022-10-02

## 2022-10-02 RX ORDER — PREDNISONE 20 MG/1
40 TABLET ORAL DAILY
Qty: 10 TABLET | Refills: 0 | Status: SHIPPED | OUTPATIENT
Start: 2022-10-02 | End: 2022-10-07

## 2022-10-02 NOTE — ED QUICK NOTES
Pt with left arm swelling/cellulitis and pain from fingertips to elbow since this AM, Left arm is slightly warmer than the right. Denies injury or trauma, no evidence of insect bites or injury.

## 2022-10-10 ENCOUNTER — APPOINTMENT (OUTPATIENT)
Dept: GENERAL RADIOLOGY | Age: 47
End: 2022-10-10
Attending: EMERGENCY MEDICINE
Payer: MEDICAID

## 2022-10-10 ENCOUNTER — HOSPITAL ENCOUNTER (EMERGENCY)
Age: 47
Discharge: HOME | End: 2022-10-10
Attending: EMERGENCY MEDICINE
Payer: MEDICAID

## 2022-10-10 VITALS
OXYGEN SATURATION: 95 % | TEMPERATURE: 97 F | HEART RATE: 87 BPM | HEIGHT: 73 IN | RESPIRATION RATE: 18 BRPM | WEIGHT: 265 LBS | DIASTOLIC BLOOD PRESSURE: 82 MMHG | SYSTOLIC BLOOD PRESSURE: 145 MMHG | BODY MASS INDEX: 35.12 KG/M2

## 2022-10-10 DIAGNOSIS — M10.9 ACUTE GOUT OF LEFT HAND, UNSPECIFIED CAUSE: Primary | ICD-10-CM

## 2022-10-10 LAB
ALBUMIN SERPL-MCNC: 3.4 G/DL (ref 3.4–5)
ALBUMIN/GLOB SERPL: 1 {RATIO} (ref 1–2)
ALP LIVER SERPL-CCNC: 61 U/L
ALT SERPL-CCNC: 30 U/L
ANION GAP SERPL CALC-SCNC: 10 MMOL/L (ref 0–18)
AST SERPL-CCNC: 11 U/L (ref 15–37)
BASOPHILS # BLD AUTO: 0.06 X10(3) UL (ref 0–0.2)
BASOPHILS NFR BLD AUTO: 0.6 %
BILIRUB SERPL-MCNC: 0.3 MG/DL (ref 0.1–2)
BUN BLD-MCNC: 12 MG/DL (ref 7–18)
CALCIUM BLD-MCNC: 8.1 MG/DL (ref 8.5–10.1)
CHLORIDE SERPL-SCNC: 103 MMOL/L (ref 98–112)
CO2 SERPL-SCNC: 27 MMOL/L (ref 21–32)
CREAT BLD-MCNC: 1.35 MG/DL
EOSINOPHIL # BLD AUTO: 0.26 X10(3) UL (ref 0–0.7)
EOSINOPHIL NFR BLD AUTO: 2.5 %
ERYTHROCYTE [DISTWIDTH] IN BLOOD BY AUTOMATED COUNT: 14.6 %
GFR SERPLBLD BASED ON 1.73 SQ M-ARVRAT: 66 ML/MIN/1.73M2 (ref 60–?)
GLOBULIN PLAS-MCNC: 3.5 G/DL (ref 2.8–4.4)
GLUCOSE BLD-MCNC: 299 MG/DL (ref 70–99)
HCT VFR BLD AUTO: 38 %
HGB BLD-MCNC: 12.8 G/DL
IMM GRANULOCYTES # BLD AUTO: 0.06 X10(3) UL (ref 0–1)
IMM GRANULOCYTES NFR BLD: 0.6 %
LYMPHOCYTES # BLD AUTO: 2.24 X10(3) UL (ref 1–4)
LYMPHOCYTES NFR BLD AUTO: 21.6 %
MCH RBC QN AUTO: 27.6 PG (ref 26–34)
MCHC RBC AUTO-ENTMCNC: 33.7 G/DL (ref 31–37)
MCV RBC AUTO: 81.9 FL
MONOCYTES # BLD AUTO: 1.05 X10(3) UL (ref 0.1–1)
MONOCYTES NFR BLD AUTO: 10.1 %
NEUTROPHILS # BLD AUTO: 6.68 X10 (3) UL (ref 1.5–7.7)
NEUTROPHILS # BLD AUTO: 6.68 X10(3) UL (ref 1.5–7.7)
NEUTROPHILS NFR BLD AUTO: 64.6 %
OSMOLALITY SERPL CALC.SUM OF ELEC: 301 MOSM/KG (ref 275–295)
PLATELET # BLD AUTO: 303 10(3)UL (ref 150–450)
POTASSIUM SERPL-SCNC: 3 MMOL/L (ref 3.5–5.1)
PROT SERPL-MCNC: 6.9 G/DL (ref 6.4–8.2)
RBC # BLD AUTO: 4.64 X10(6)UL
SODIUM SERPL-SCNC: 140 MMOL/L (ref 136–145)
WBC # BLD AUTO: 10.4 X10(3) UL (ref 4–11)

## 2022-10-10 PROCEDURE — 99284 EMERGENCY DEPT VISIT MOD MDM: CPT

## 2022-10-10 PROCEDURE — 85025 COMPLETE CBC W/AUTO DIFF WBC: CPT | Performed by: EMERGENCY MEDICINE

## 2022-10-10 PROCEDURE — 96374 THER/PROPH/DIAG INJ IV PUSH: CPT

## 2022-10-10 PROCEDURE — 96375 TX/PRO/DX INJ NEW DRUG ADDON: CPT

## 2022-10-10 PROCEDURE — 73130 X-RAY EXAM OF HAND: CPT | Performed by: EMERGENCY MEDICINE

## 2022-10-10 PROCEDURE — 73110 X-RAY EXAM OF WRIST: CPT | Performed by: EMERGENCY MEDICINE

## 2022-10-10 PROCEDURE — 73090 X-RAY EXAM OF FOREARM: CPT | Performed by: EMERGENCY MEDICINE

## 2022-10-10 PROCEDURE — 80053 COMPREHEN METABOLIC PANEL: CPT | Performed by: EMERGENCY MEDICINE

## 2022-10-10 RX ORDER — POTASSIUM CHLORIDE 20 MEQ/1
40 TABLET, EXTENDED RELEASE ORAL ONCE
Status: COMPLETED | OUTPATIENT
Start: 2022-10-10 | End: 2022-10-10

## 2022-10-10 RX ORDER — PREDNISONE 20 MG/1
60 TABLET ORAL ONCE
Status: COMPLETED | OUTPATIENT
Start: 2022-10-10 | End: 2022-10-10

## 2022-10-10 RX ORDER — HYDROMORPHONE HYDROCHLORIDE 1 MG/ML
0.5 INJECTION, SOLUTION INTRAMUSCULAR; INTRAVENOUS; SUBCUTANEOUS EVERY 30 MIN PRN
Status: DISCONTINUED | OUTPATIENT
Start: 2022-10-10 | End: 2022-10-10

## 2022-10-10 RX ORDER — ONDANSETRON 2 MG/ML
4 INJECTION INTRAMUSCULAR; INTRAVENOUS ONCE
Status: COMPLETED | OUTPATIENT
Start: 2022-10-10 | End: 2022-10-10

## 2022-10-10 RX ORDER — PREDNISONE 20 MG/1
40 TABLET ORAL DAILY
Qty: 10 TABLET | Refills: 0 | Status: SHIPPED | OUTPATIENT
Start: 2022-10-10 | End: 2022-10-15

## 2022-10-10 RX ORDER — ALLOPURINOL 100 MG/1
100 TABLET ORAL DAILY
Qty: 10 TABLET | Refills: 0 | Status: SHIPPED | OUTPATIENT
Start: 2022-10-10 | End: 2022-10-20

## 2022-10-10 NOTE — ED INITIAL ASSESSMENT (HPI)
Pt to ed with c/o L arm and hand swelling. Pt states he was seen here last week for the same and he was given prednisone. Pt states the swelling improved while on the medication but when he finished the swelling came back. States it is very painful and has limited ROM due to the pain.

## 2022-11-12 ENCOUNTER — HOSPITAL ENCOUNTER (EMERGENCY)
Age: 47
Discharge: HOME | End: 2022-11-12
Attending: EMERGENCY MEDICINE
Payer: MEDICAID

## 2022-11-12 VITALS
RESPIRATION RATE: 20 BRPM | WEIGHT: 265 LBS | OXYGEN SATURATION: 99 % | SYSTOLIC BLOOD PRESSURE: 159 MMHG | BODY MASS INDEX: 35 KG/M2 | HEART RATE: 97 BPM | TEMPERATURE: 99 F | DIASTOLIC BLOOD PRESSURE: 89 MMHG

## 2022-11-12 DIAGNOSIS — M25.532 LEFT WRIST PAIN: Primary | ICD-10-CM

## 2022-11-12 LAB
ALBUMIN SERPL-MCNC: 3.7 G/DL (ref 3.4–5)
ALBUMIN/GLOB SERPL: 1 {RATIO} (ref 1–2)
ALP LIVER SERPL-CCNC: 58 U/L
ALT SERPL-CCNC: 29 U/L
ANION GAP SERPL CALC-SCNC: 8 MMOL/L (ref 0–18)
AST SERPL-CCNC: 12 U/L (ref 15–37)
BASOPHILS # BLD AUTO: 0.06 X10(3) UL (ref 0–0.2)
BASOPHILS NFR BLD AUTO: 0.6 %
BILIRUB SERPL-MCNC: 0.3 MG/DL (ref 0.1–2)
BUN BLD-MCNC: 17 MG/DL (ref 7–18)
CALCIUM BLD-MCNC: 8.6 MG/DL (ref 8.5–10.1)
CHLORIDE SERPL-SCNC: 105 MMOL/L (ref 98–112)
CO2 SERPL-SCNC: 26 MMOL/L (ref 21–32)
CREAT BLD-MCNC: 1.47 MG/DL
EOSINOPHIL # BLD AUTO: 0.1 X10(3) UL (ref 0–0.7)
EOSINOPHIL NFR BLD AUTO: 1.1 %
ERYTHROCYTE [DISTWIDTH] IN BLOOD BY AUTOMATED COUNT: 14.1 %
GFR SERPLBLD BASED ON 1.73 SQ M-ARVRAT: 60 ML/MIN/1.73M2 (ref 60–?)
GLOBULIN PLAS-MCNC: 3.7 G/DL (ref 2.8–4.4)
GLUCOSE BLD-MCNC: 274 MG/DL (ref 70–99)
HCT VFR BLD AUTO: 38.9 %
HGB BLD-MCNC: 12.9 G/DL
IMM GRANULOCYTES # BLD AUTO: 0.03 X10(3) UL (ref 0–1)
IMM GRANULOCYTES NFR BLD: 0.3 %
LYMPHOCYTES # BLD AUTO: 2.51 X10(3) UL (ref 1–4)
LYMPHOCYTES NFR BLD AUTO: 27 %
MCH RBC QN AUTO: 27 PG (ref 26–34)
MCHC RBC AUTO-ENTMCNC: 33.2 G/DL (ref 31–37)
MCV RBC AUTO: 81.6 FL
MONOCYTES # BLD AUTO: 0.88 X10(3) UL (ref 0.1–1)
MONOCYTES NFR BLD AUTO: 9.5 %
NEUTROPHILS # BLD AUTO: 5.72 X10 (3) UL (ref 1.5–7.7)
NEUTROPHILS # BLD AUTO: 5.72 X10(3) UL (ref 1.5–7.7)
NEUTROPHILS NFR BLD AUTO: 61.5 %
OSMOLALITY SERPL CALC.SUM OF ELEC: 299 MOSM/KG (ref 275–295)
PLATELET # BLD AUTO: 300 10(3)UL (ref 150–450)
POTASSIUM SERPL-SCNC: 3.5 MMOL/L (ref 3.5–5.1)
PROT SERPL-MCNC: 7.4 G/DL (ref 6.4–8.2)
RBC # BLD AUTO: 4.77 X10(6)UL
SODIUM SERPL-SCNC: 139 MMOL/L (ref 136–145)
WBC # BLD AUTO: 9.3 X10(3) UL (ref 4–11)

## 2022-11-12 PROCEDURE — 85025 COMPLETE CBC W/AUTO DIFF WBC: CPT | Performed by: EMERGENCY MEDICINE

## 2022-11-12 PROCEDURE — 80053 COMPREHEN METABOLIC PANEL: CPT | Performed by: EMERGENCY MEDICINE

## 2022-11-12 PROCEDURE — 99283 EMERGENCY DEPT VISIT LOW MDM: CPT

## 2022-11-12 PROCEDURE — 36415 COLL VENOUS BLD VENIPUNCTURE: CPT

## 2022-11-12 RX ORDER — HYDROCODONE BITARTRATE AND ACETAMINOPHEN 5; 325 MG/1; MG/1
1 TABLET ORAL ONCE
Status: COMPLETED | OUTPATIENT
Start: 2022-11-12 | End: 2022-11-12

## 2022-11-12 RX ORDER — METHYLPREDNISOLONE 4 MG/1
TABLET ORAL
Qty: 1 EACH | Refills: 0 | Status: SHIPPED | OUTPATIENT
Start: 2022-11-12

## 2022-11-12 RX ORDER — HYDROCODONE BITARTRATE AND ACETAMINOPHEN 7.5; 325 MG/1; MG/1
1-2 TABLET ORAL EVERY 6 HOURS PRN
Qty: 10 TABLET | Refills: 0 | Status: SHIPPED | OUTPATIENT
Start: 2022-11-12 | End: 2022-11-17

## 2022-11-13 NOTE — DISCHARGE INSTRUCTIONS
Follow-up with a rheumatologist and orthopedic doctor within the next 1 to 2 weeks for reassessment. Take the pain medicine as prescribed. It can make you drowsy, so be careful in taking it. The steroid will spike your blood sugars, be sure to check them on a daily basis. If they become elevated (persistently above 300), contact your primary care doctor.

## 2023-11-04 ENCOUNTER — HOSPITAL ENCOUNTER (EMERGENCY)
Age: 48
Discharge: HOME | End: 2023-11-04
Attending: EMERGENCY MEDICINE
Payer: MEDICAID

## 2023-11-04 VITALS
DIASTOLIC BLOOD PRESSURE: 106 MMHG | HEIGHT: 76 IN | OXYGEN SATURATION: 98 % | SYSTOLIC BLOOD PRESSURE: 154 MMHG | WEIGHT: 235 LBS | HEART RATE: 87 BPM | BODY MASS INDEX: 28.62 KG/M2 | TEMPERATURE: 98 F | RESPIRATION RATE: 17 BRPM

## 2023-11-04 DIAGNOSIS — M25.532 LEFT WRIST PAIN: Primary | ICD-10-CM

## 2023-11-04 LAB — GLUCOSE BLD-MCNC: 80 MG/DL (ref 70–99)

## 2023-11-04 PROCEDURE — 82962 GLUCOSE BLOOD TEST: CPT

## 2023-11-04 PROCEDURE — 99284 EMERGENCY DEPT VISIT MOD MDM: CPT

## 2023-11-04 PROCEDURE — 99283 EMERGENCY DEPT VISIT LOW MDM: CPT

## 2023-11-04 RX ORDER — HYDROCODONE BITARTRATE AND ACETAMINOPHEN 5; 325 MG/1; MG/1
1-2 TABLET ORAL EVERY 6 HOURS PRN
Qty: 10 TABLET | Refills: 0 | Status: SHIPPED | OUTPATIENT
Start: 2023-11-04 | End: 2023-11-09

## 2023-11-04 RX ORDER — METHYLPREDNISOLONE 4 MG/1
TABLET ORAL
Qty: 1 EACH | Refills: 0 | Status: SHIPPED | OUTPATIENT
Start: 2023-11-04

## 2023-11-04 RX ORDER — PRAVASTATIN SODIUM 20 MG
40 TABLET ORAL NIGHTLY
COMMUNITY

## 2023-11-04 RX ORDER — VALSARTAN 160 MG/1
160 TABLET ORAL DAILY
COMMUNITY

## 2023-11-04 RX ORDER — PREDNISONE 20 MG/1
60 TABLET ORAL ONCE
Status: COMPLETED | OUTPATIENT
Start: 2023-11-04 | End: 2023-11-04

## 2023-11-04 RX ORDER — HYDROCODONE BITARTRATE AND ACETAMINOPHEN 5; 325 MG/1; MG/1
1 TABLET ORAL ONCE
Status: COMPLETED | OUTPATIENT
Start: 2023-11-04 | End: 2023-11-04

## 2023-11-04 NOTE — ED INITIAL ASSESSMENT (HPI)
Pt reports pain, swelling and warmth to lt hand extending up forearm for the past 2 days.  Pt denies injury

## 2023-11-05 NOTE — DISCHARGE INSTRUCTIONS
Follow-up with a rheumatologist in the next 1 week to establish care. Follow-up with a cardiologist as well to establish care. Take the medication as prescribed. Norco can make you drowsy, super careful when taking. Return for fevers, worsening pain or any other concerning symptoms.

## 2024-10-08 ENCOUNTER — APPOINTMENT (OUTPATIENT)
Dept: GENERAL RADIOLOGY | Age: 49
End: 2024-10-08
Payer: COMMERCIAL

## 2024-10-08 ENCOUNTER — HOSPITAL ENCOUNTER (OUTPATIENT)
Facility: HOSPITAL | Age: 49
Setting detail: OBSERVATION
Discharge: HOME | End: 2024-10-11
Attending: EMERGENCY MEDICINE | Admitting: STUDENT IN AN ORGANIZED HEALTH CARE EDUCATION/TRAINING PROGRAM
Payer: COMMERCIAL

## 2024-10-08 DIAGNOSIS — R94.31 ABNORMAL EKG: Primary | ICD-10-CM

## 2024-10-08 DIAGNOSIS — R07.89 CHEST PAIN, ATYPICAL: ICD-10-CM

## 2024-10-08 LAB
ALBUMIN SERPL-MCNC: 3.4 G/DL (ref 3.4–5)
ALBUMIN/GLOB SERPL: 1 {RATIO} (ref 1–2)
ALP LIVER SERPL-CCNC: 51 U/L
ALT SERPL-CCNC: 27 U/L
ANION GAP SERPL CALC-SCNC: 6 MMOL/L (ref 0–18)
AST SERPL-CCNC: 15 U/L (ref 15–37)
BASOPHILS # BLD AUTO: 0.08 X10(3) UL (ref 0–0.2)
BASOPHILS NFR BLD AUTO: 1.1 %
BILIRUB SERPL-MCNC: 0.4 MG/DL (ref 0.1–2)
BUN BLD-MCNC: 11 MG/DL (ref 9–23)
CALCIUM BLD-MCNC: 9 MG/DL (ref 8.5–10.1)
CHLORIDE SERPL-SCNC: 104 MMOL/L (ref 98–112)
CO2 SERPL-SCNC: 27 MMOL/L (ref 21–32)
CREAT BLD-MCNC: 1.19 MG/DL
EGFRCR SERPLBLD CKD-EPI 2021: 76 ML/MIN/1.73M2 (ref 60–?)
EOSINOPHIL # BLD AUTO: 0.28 X10(3) UL (ref 0–0.7)
EOSINOPHIL NFR BLD AUTO: 4 %
ERYTHROCYTE [DISTWIDTH] IN BLOOD BY AUTOMATED COUNT: 14 %
GLOBULIN PLAS-MCNC: 3.4 G/DL (ref 2.8–4.4)
GLUCOSE BLD-MCNC: 170 MG/DL (ref 70–99)
GLUCOSE BLD-MCNC: 234 MG/DL (ref 70–99)
HCT VFR BLD AUTO: 42.4 %
HGB BLD-MCNC: 14.5 G/DL
IMM GRANULOCYTES # BLD AUTO: 0.02 X10(3) UL (ref 0–1)
IMM GRANULOCYTES NFR BLD: 0.3 %
LYMPHOCYTES # BLD AUTO: 2.52 X10(3) UL (ref 1–4)
LYMPHOCYTES NFR BLD AUTO: 35.6 %
MCH RBC QN AUTO: 28.3 PG (ref 26–34)
MCHC RBC AUTO-ENTMCNC: 34.2 G/DL (ref 31–37)
MCV RBC AUTO: 82.8 FL
MONOCYTES # BLD AUTO: 0.48 X10(3) UL (ref 0.1–1)
MONOCYTES NFR BLD AUTO: 6.8 %
NEUTROPHILS # BLD AUTO: 3.7 X10 (3) UL (ref 1.5–7.7)
NEUTROPHILS # BLD AUTO: 3.7 X10(3) UL (ref 1.5–7.7)
NEUTROPHILS NFR BLD AUTO: 52.2 %
OSMOLALITY SERPL CALC.SUM OF ELEC: 291 MOSM/KG (ref 275–295)
PLATELET # BLD AUTO: 248 10(3)UL (ref 150–450)
POTASSIUM SERPL-SCNC: 3.6 MMOL/L (ref 3.5–5.1)
PROT SERPL-MCNC: 6.8 G/DL (ref 6.4–8.2)
RBC # BLD AUTO: 5.12 X10(6)UL
SARS-COV-2 RNA RESP QL NAA+PROBE: NOT DETECTED
SODIUM SERPL-SCNC: 137 MMOL/L (ref 136–145)
TROPONIN I SERPL HS-MCNC: 30 NG/L
WBC # BLD AUTO: 7.1 X10(3) UL (ref 4–11)

## 2024-10-08 PROCEDURE — 71046 X-RAY EXAM CHEST 2 VIEWS: CPT

## 2024-10-08 PROCEDURE — 99223 1ST HOSP IP/OBS HIGH 75: CPT | Performed by: HOSPITALIST

## 2024-10-08 RX ORDER — CARVEDILOL 12.5 MG/1
25 TABLET ORAL 2 TIMES DAILY WITH MEALS
Status: DISCONTINUED | OUTPATIENT
Start: 2024-10-08 | End: 2024-10-10

## 2024-10-08 RX ORDER — PROCHLORPERAZINE EDISYLATE 5 MG/ML
5 INJECTION INTRAMUSCULAR; INTRAVENOUS EVERY 8 HOURS PRN
Status: DISCONTINUED | OUTPATIENT
Start: 2024-10-08 | End: 2024-10-11

## 2024-10-08 RX ORDER — ENOXAPARIN SODIUM 100 MG/ML
40 INJECTION SUBCUTANEOUS DAILY
Status: DISCONTINUED | OUTPATIENT
Start: 2024-10-09 | End: 2024-10-11

## 2024-10-08 RX ORDER — POLYETHYLENE GLYCOL 3350 17 G/17G
17 POWDER, FOR SOLUTION ORAL DAILY PRN
Status: DISCONTINUED | OUTPATIENT
Start: 2024-10-08 | End: 2024-10-11

## 2024-10-08 RX ORDER — POTASSIUM CHLORIDE 1500 MG/1
40 TABLET, EXTENDED RELEASE ORAL EVERY 4 HOURS
Status: COMPLETED | OUTPATIENT
Start: 2024-10-08 | End: 2024-10-09

## 2024-10-08 RX ORDER — ACETAMINOPHEN 500 MG
500 TABLET ORAL EVERY 4 HOURS PRN
Status: DISCONTINUED | OUTPATIENT
Start: 2024-10-08 | End: 2024-10-10

## 2024-10-08 RX ORDER — SENNOSIDES 8.6 MG
17.2 TABLET ORAL NIGHTLY PRN
Status: DISCONTINUED | OUTPATIENT
Start: 2024-10-08 | End: 2024-10-11

## 2024-10-08 RX ORDER — LOSARTAN POTASSIUM 100 MG/1
100 TABLET ORAL DAILY
Status: DISCONTINUED | OUTPATIENT
Start: 2024-10-09 | End: 2024-10-11

## 2024-10-08 RX ORDER — PRAVASTATIN SODIUM 20 MG
40 TABLET ORAL NIGHTLY
Status: DISCONTINUED | OUTPATIENT
Start: 2024-10-08 | End: 2024-10-11

## 2024-10-08 RX ORDER — ALLOPURINOL 100 MG/1
300 TABLET ORAL DAILY
Status: DISCONTINUED | OUTPATIENT
Start: 2024-10-08 | End: 2024-10-11

## 2024-10-08 RX ORDER — DEXTROSE MONOHYDRATE 25 G/50ML
50 INJECTION, SOLUTION INTRAVENOUS
Status: DISCONTINUED | OUTPATIENT
Start: 2024-10-08 | End: 2024-10-11

## 2024-10-08 RX ORDER — GABAPENTIN 300 MG/1
300 CAPSULE ORAL 2 TIMES DAILY
Status: DISCONTINUED | OUTPATIENT
Start: 2024-10-08 | End: 2024-10-11

## 2024-10-08 RX ORDER — ASPIRIN 81 MG/1
324 TABLET, CHEWABLE ORAL ONCE
Status: COMPLETED | OUTPATIENT
Start: 2024-10-08 | End: 2024-10-08

## 2024-10-08 RX ORDER — LABETALOL HYDROCHLORIDE 5 MG/ML
20 INJECTION, SOLUTION INTRAVENOUS ONCE
Status: COMPLETED | OUTPATIENT
Start: 2024-10-08 | End: 2024-10-08

## 2024-10-08 RX ORDER — NICOTINE POLACRILEX 4 MG
30 LOZENGE BUCCAL
Status: DISCONTINUED | OUTPATIENT
Start: 2024-10-08 | End: 2024-10-11

## 2024-10-08 RX ORDER — SODIUM PHOSPHATE, DIBASIC AND SODIUM PHOSPHATE, MONOBASIC 7; 19 G/230ML; G/230ML
1 ENEMA RECTAL ONCE AS NEEDED
Status: DISCONTINUED | OUTPATIENT
Start: 2024-10-08 | End: 2024-10-11

## 2024-10-08 RX ORDER — ONDANSETRON 2 MG/ML
4 INJECTION INTRAMUSCULAR; INTRAVENOUS EVERY 6 HOURS PRN
Status: DISCONTINUED | OUTPATIENT
Start: 2024-10-08 | End: 2024-10-11

## 2024-10-08 RX ORDER — DEXAMETHASONE SODIUM PHOSPHATE 4 MG/ML
10 VIAL (ML) INJECTION ONCE
Status: DISCONTINUED | OUTPATIENT
Start: 2024-10-08 | End: 2024-10-08

## 2024-10-08 RX ORDER — MELATONIN
3 NIGHTLY PRN
Status: DISCONTINUED | OUTPATIENT
Start: 2024-10-08 | End: 2024-10-11

## 2024-10-08 RX ORDER — NICOTINE POLACRILEX 4 MG
15 LOZENGE BUCCAL
Status: DISCONTINUED | OUTPATIENT
Start: 2024-10-08 | End: 2024-10-11

## 2024-10-08 RX ORDER — DILTIAZEM HYDROCHLORIDE 180 MG/1
180 CAPSULE, EXTENDED RELEASE ORAL DAILY
COMMUNITY
End: 2024-10-11

## 2024-10-08 RX ORDER — BISACODYL 10 MG
10 SUPPOSITORY, RECTAL RECTAL
Status: DISCONTINUED | OUTPATIENT
Start: 2024-10-08 | End: 2024-10-11

## 2024-10-08 NOTE — ED INITIAL ASSESSMENT (HPI)
Pt states he was dx with covid on 9/12. States feels like he can't get rid of it. C/O cough, fatigue, sob, loss of taste and smell.    ENT

## 2024-10-09 ENCOUNTER — APPOINTMENT (OUTPATIENT)
Dept: CV DIAGNOSTICS | Facility: HOSPITAL | Age: 49
End: 2024-10-09
Attending: HOSPITALIST
Payer: COMMERCIAL

## 2024-10-09 PROBLEM — E78.00 PURE HYPERCHOLESTEROLEMIA: Chronic | Status: ACTIVE | Noted: 2024-10-09

## 2024-10-09 LAB
ANION GAP SERPL CALC-SCNC: 9 MMOL/L (ref 0–18)
ATRIAL RATE: 80 BPM
BASOPHILS # BLD AUTO: 0.07 X10(3) UL (ref 0–0.2)
BASOPHILS NFR BLD AUTO: 1 %
BUN BLD-MCNC: 12 MG/DL (ref 9–23)
CALCIUM BLD-MCNC: 8.6 MG/DL (ref 8.7–10.4)
CHLORIDE SERPL-SCNC: 104 MMOL/L (ref 98–112)
CO2 SERPL-SCNC: 27 MMOL/L (ref 21–32)
CREAT BLD-MCNC: 1.13 MG/DL
EGFRCR SERPLBLD CKD-EPI 2021: 81 ML/MIN/1.73M2 (ref 60–?)
EOSINOPHIL # BLD AUTO: 0.28 X10(3) UL (ref 0–0.7)
EOSINOPHIL NFR BLD AUTO: 4 %
ERYTHROCYTE [DISTWIDTH] IN BLOOD BY AUTOMATED COUNT: 13.9 %
EST. AVERAGE GLUCOSE BLD GHB EST-MCNC: 283 MG/DL (ref 68–126)
GLUCOSE BLD-MCNC: 198 MG/DL (ref 70–99)
GLUCOSE BLD-MCNC: 241 MG/DL (ref 70–99)
GLUCOSE BLD-MCNC: 257 MG/DL (ref 70–99)
GLUCOSE BLD-MCNC: 267 MG/DL (ref 70–99)
GLUCOSE BLD-MCNC: 280 MG/DL (ref 70–99)
HBA1C MFR BLD: 11.5 % (ref ?–5.7)
HCT VFR BLD AUTO: 38.6 %
HGB BLD-MCNC: 13.4 G/DL
IMM GRANULOCYTES # BLD AUTO: 0.02 X10(3) UL (ref 0–1)
IMM GRANULOCYTES NFR BLD: 0.3 %
LIPASE SERPL-CCNC: 37 U/L (ref 12–53)
LYMPHOCYTES # BLD AUTO: 2.76 X10(3) UL (ref 1–4)
LYMPHOCYTES NFR BLD AUTO: 39.8 %
MCH RBC QN AUTO: 28.5 PG (ref 26–34)
MCHC RBC AUTO-ENTMCNC: 34.7 G/DL (ref 31–37)
MCV RBC AUTO: 82.1 FL
MONOCYTES # BLD AUTO: 0.52 X10(3) UL (ref 0.1–1)
MONOCYTES NFR BLD AUTO: 7.5 %
NEUTROPHILS # BLD AUTO: 3.28 X10 (3) UL (ref 1.5–7.7)
NEUTROPHILS # BLD AUTO: 3.28 X10(3) UL (ref 1.5–7.7)
NEUTROPHILS NFR BLD AUTO: 47.4 %
OSMOLALITY SERPL CALC.SUM OF ELEC: 300 MOSM/KG (ref 275–295)
P AXIS: 37 DEGREES
P-R INTERVAL: 160 MS
PLATELET # BLD AUTO: 213 10(3)UL (ref 150–450)
POTASSIUM SERPL-SCNC: 3.9 MMOL/L (ref 3.5–5.1)
Q-T INTERVAL: 362 MS
QRS DURATION: 88 MS
QTC CALCULATION (BEZET): 417 MS
R AXIS: -46 DEGREES
RBC # BLD AUTO: 4.7 X10(6)UL
SODIUM SERPL-SCNC: 140 MMOL/L (ref 136–145)
T AXIS: 125 DEGREES
TROPONIN I SERPL HS-MCNC: 19 NG/L
TROPONIN I SERPL HS-MCNC: 21 NG/L
VENTRICULAR RATE: 80 BPM
WBC # BLD AUTO: 6.9 X10(3) UL (ref 4–11)

## 2024-10-09 PROCEDURE — 93306 TTE W/DOPPLER COMPLETE: CPT | Performed by: HOSPITALIST

## 2024-10-09 PROCEDURE — 99232 SBSQ HOSP IP/OBS MODERATE 35: CPT | Performed by: HOSPITALIST

## 2024-10-09 RX ORDER — SPIRONOLACTONE 25 MG/1
25 TABLET ORAL DAILY
Status: DISCONTINUED | OUTPATIENT
Start: 2024-10-09 | End: 2024-10-11

## 2024-10-09 RX ORDER — METOPROLOL TARTRATE 1 MG/ML
5 INJECTION, SOLUTION INTRAVENOUS ONCE
Status: COMPLETED | OUTPATIENT
Start: 2024-10-09 | End: 2024-10-09

## 2024-10-09 NOTE — H&P
[facility]  History and Physical     Karel Sy Patient Status:  Emergency    1975 MRN [id number]   Location [place] EMERGENCY DEPARTMENT IN [place] Attending Tommie Schneider MD   Hosp Day # 0 PCP CARLITO HOWELL     Chief Complaint: chest pain    Subjective:    History of Present Illness:     Karel Sy is a 49 year old male with history of type 2 diabetes, congestive heart failure, hypertension presents to the emergency room with right-sided chest pain.  Chest pain only hurts when he coughs there is no ongoing cough since he was diagnosed with COVID over a month ago.  Patient reports he was diaphoretic and short of breath.  No dizziness, lightheadedness.  Patient reported some nausea but no vomiting.  No palpitations, dizziness, lightheadedness, or syncope.  No fevers, chills, diarrhea or constipation.  No orthopnea or PND    History/Other:    Past Medical History:  Past Medical History:    Congestive heart disease (HCC)    Diabetes (HCC)    Essential hypertension    High blood pressure     Past Surgical History:   History reviewed. No pertinent surgical history.   Family History:   History reviewed. No pertinent family history.  Social History:    reports that he has never smoked. He has never used smokeless tobacco. He reports that he does not currently use alcohol. He reports that he does not currently use drugs.     Allergies:   Allergies   Allergen Reactions    Shrimp SHORTNESS OF BREATH    Codeine NAUSEA AND VOMITING       Medications:    No current facility-administered medications on file prior to encounter.     Current Outpatient Medications on File Prior to Encounter   Medication Sig Dispense Refill    pravastatin 20 MG Oral Tab Take 2 tablets (40 mg total) by mouth nightly.      dilTIAZem HCl (DILACOR XR OR) Take by mouth.      valsartan 160 MG Oral Tab Take 1 tablet (160 mg total) by mouth daily.      insulin aspart 100 UNIT/ML Subcutaneous Solution Cartridge Inject into  the skin once.      metFORMIN 850 MG Oral Tab Take 1 tablet (850 mg total) by mouth 2 (two) times daily with meals.      gabapentin 300 MG Oral Cap Take 1 capsule (300 mg total) by mouth in the morning and 1 capsule (300 mg total) before bedtime.      allopurinol 300 MG Oral Tab Take 1 tablet (300 mg total) by mouth daily.      carvedilol 25 MG Oral Tab Take 1 tablet (25 mg total) by mouth 2 (two) times daily with meals.      glipiZIDE 10 MG Oral Tab Take 1 tablet (10 mg total) by mouth 2 (two) times daily before meals.      methylPREDNISolone (MEDROL) 4 MG Oral Tablet Therapy Pack Dosepack: take as directed (Patient not taking: Reported on 10/8/2024) 1 each 0    methylPREDNISolone (MEDROL) 4 MG Oral Tablet Therapy Pack Dosepack: take as directed (Patient not taking: Reported on 10/8/2024) 1 each 0    allopurinol 100 MG Oral Tab Take 1 tablet (100 mg total) by mouth daily. (Patient not taking: Reported on 10/8/2024) 30 tablet 0    cyclobenzaprine 10 MG Oral Tab Take 10 mg by mouth 3 (three) times daily as needed for Muscle spasms. (Patient not taking: Reported on 10/1/2022)      Insulin NPH & Regular 70/30 (70-30) 100 UNIT/ML Subcutaneous Suspension Inject into the skin 2 (two) times daily before meals. (Patient not taking: Reported on 10/1/2022)      Insulin Lispro 100 UNIT/ML Subcutaneous Solution Inject into the skin 3 (three) times daily before meals. (Patient not taking: Reported on 10/1/2022)         Review of Systems:   A comprehensive review of systems was completed.    Pertinent positives and negatives noted in the HPI.    Objective:   Physical Exam:    BP (!) 177/108   Pulse 75   Temp 98.6 °F (37 °C) (Oral)   Resp 16   Ht 6' 4\" (1.93 m)   Wt 250 lb (113.4 kg)   SpO2 97%   BMI 30.43 kg/m²   General: No acute distress, Alert  Respiratory: No rhonchi, no wheezes  Cardiovascular: S1, S2. Regular rate and rhythm  Abdomen: Soft, Non-tender, non-distended, positive bowel sounds  Neuro: No new focal  deficits  Extremities: No edema      Results:    Labs:      Labs Last 24 Hours:    Recent Labs   Lab 10/08/24  1933   RBC 5.12   HGB 14.5   HCT 42.4   MCV 82.8   MCH 28.3   MCHC 34.2   RDW 14.0   NEPRELIM 3.70   WBC 7.1   .0       Recent Labs   Lab 10/08/24  1933   *   BUN 11   CREATSERUM 1.19   EGFRCR 76   CA 9.0   ALB 3.4      K 3.6      CO2 27.0   ALKPHO 51   AST 15   ALT 27   BILT 0.4   TP 6.8       No results found for: \"PT\", \"INR\"    Recent Labs   Lab 10/08/24  1933   TROPHS 30       No results for input(s): \"TROP\", \"PBNP\" in the last 168 hours.    No results for input(s): \"PCT\" in the last 168 hours.    Imaging: Imaging data reviewed in Epic.    Assessment & Plan:      #chest pain  - will rule out ACS with serial troponins  - Echo in the am  -cardiology on consult    # Hypertension  - Will continue on  coreg, ARB.    # Hyperlipidemia  - Will continue on statin therapy    # Type 2 diabetes  - will place on hyperglycemia protocol with correction factor insulin.       Plan of care discussed with patient at bedside.    Toni Feuntes, DO    Supplementary Documentation:     The 21st Century Cures Act makes medical notes like these available to patients in the interest of transparency. Please be advised this is a medical document. Medical documents are intended to carry relevant information, facts as evident, and the clinical opinion of the practitioner. The medical note is intended as peer to peer communication and may appear blunt or direct. It is written in medical language and may contain abbreviations or verbiage that are unfamiliar.

## 2024-10-09 NOTE — PLAN OF CARE
Karel Sy Patient Status:  Observation    1975 MRN [id number]   Location [facility] 3NE-A Attending Soumya Lozoya MD   Hosp Day # 0 PCP CARLITO HOWELL       Cardiology Nocturnal APN Note    Page Received: Dr. Schneider, ED Physician    HPI:     Patient is a 49 year old male with PMH of HTN, combined systolic and diastolic heart failure and DM II who presented to the ED with c/o chest pain, generalized weakness and cough. Pt diagnosed a few weeks ago with COVID. Pt stated chest pain was right sided. In ED, blood pressure was noted to be elevated with SBP up to the 190s and DBP up to 120s. EKG showed nonspecific changes. Labs were unremarkable. [facility] consulted for hypertension and atypical chest pain. Echocardiogram completed in  normal left ventricular cavity size. Mildly increased wall thickness. EF 50%. Grade I diastolic dysfunction. Stress test in  negative for significant reversible ischemia. HPI obtained from chart review and information provided by ED physician.       ED Clinical Course    EKG: Normal sinus rhythm. T wave inversions.     Labs: Unremarkable    Imaging: CXR unremarkable    Mediations: Aspirin 324 mg po, labetalol 20 mg IVP, Po Potassium          Assessment/Plan:    - BP control  - 2D echocardiogram pending  - Continue to monitor overnight on telemetry  - Formal cardiology consult to follow in AM.       LUIS Bennett  [facility] [place]  10/9/2024  5:03 AM

## 2024-10-09 NOTE — PLAN OF CARE
Assumed care of patient after transfer from [facility] ED. Pt admitted for observation after having chest pain and changes from his last EKG. Alert and oriented. RA. NSR on tele monitor. Pain noted in right pectoral/side. Pt states pain is worse when he moves arm and takes deep breaths. Headaches noted. No SOB or nausea noted. Afebrile. Per pt request, mother, Concepcion, contacted and updated on POC. See MAR and flowsheets for further information.

## 2024-10-09 NOTE — ED PROVIDER NOTES
Patient Seen in: [facility] In [place]      History     Chief Complaint   Patient presents with    Cough/URI     Stated Complaint: Diagnosed with covid 9/12- still c/o cough, no taste/smell/generalized weakness    Subjective:   HPI    \    Patient was diagnosed with COVID over a month ago.  He is here for ongoing cough, generalized weakness.  Occasionally has chest pain that grabs him in the right side of his chest.  Occurs  only with cough.    No fevers or chills.  No recent travel surgeries or hospitalizations.  No history VTE.  He has a history diabetes and hypertension, states he is compliant with his medications.      Objective:     Past Medical History:    Congestive heart disease (HCC)    Diabetes (HCC)    Essential hypertension              History reviewed. No pertinent surgical history.             Social History     Socioeconomic History    Marital status: Single   Tobacco Use    Smoking status: Never    Smokeless tobacco: Never   Vaping Use    Vaping status: Never Used   Substance and Sexual Activity    Alcohol use: Not Currently    Drug use: Not Currently                  Physical Exam     ED Triage Vitals [10/08/24 1718]   BP (!) 196/125   Pulse 84   Resp 18   Temp 98.6 °F (37 °C)   Temp src Oral   SpO2 98 %   O2 Device None (Room air)       Current Vitals:   Vital Signs  BP: (!) 177/108  Pulse: 75  Resp: 16  Temp: 98.6 °F (37 °C)  Temp src: Oral    Oxygen Therapy  SpO2: 97 %  O2 Device: None (Room air)        Physical Exam  Physical Exam   Constitutional: Awake, alert, well appearing  Head: Normocephalic and atraumatic.   Eyes: Conjunctivae are normal. Pupils are equal, round, and reactive to light.   Neck: Normal range of motion. No JVD  Cardiovascular: Normal rate, regular rhythm  Pulmonary/Chest: Normal effort.  No accessory muscle use.  No cyanosis.  Abdominal: Soft. Not distended.  Neurological: Pt is alert and oriented to person, place, and time. no cranial nerve deficits.  Speech fluent      Lungs clear adequate air exchange    ED Course     Labs Reviewed   COMP METABOLIC PANEL (14) - Abnormal; Notable for the following components:       Result Value    Glucose 234 (*)     All other components within normal limits   TROPONIN I HIGH SENSITIVITY - Normal   CBC WITH DIFFERENTIAL WITH PLATELET   RAINBOW DRAW BLUE   RAPID SARS-COV-2 BY PCR     EKG    Rate, intervals and axes as noted on EKG Report.  Rate: 80  Rhythm: Sinus Rhythm  Reading:   Sinus rhythm, new T wave inversion in the high lateral leads as well as the precordium.  ST changes in V2 and V3 seems similar to previous EKGs.              Independent interpretation of imaging : I looked at the chest x-ray and noted no pneumonia or acute CHF  XR CHEST PA + LAT CHEST (CPT=71046)    Result Date: 10/8/2024  CONCLUSION:   Normal cardiac and mediastinal contours.  No pulmonary edema or focal airspace consolidation.  The pleural spaces are clear.  Regional osseous structures are normal.    LOCATION:  [facility]   Dictated by (CST): Marta Mancia MD on 10/08/2024 at 6:00 PM     Finalized by (CST): Marta Mancia MD on 10/08/2024 at 6:00 PM             MDM          Differential diagnosis included : Atypical resignation of life-threatening ACS, pneumonia, bronchitis, chest wall pain all considered    The patient's pain is atypical, nonexertional and does not sound suggestive of ACS by history.  He does have some EKG changes that are new and he has risk factors for CAD, hypertension, diabetes.  Will check labs and reevaluate.      2130  I discussed results with patient.  Discussed was troponin is negativebut he is having some exertional symptoms.  In all likelihood these are related to his recent COVID infection but he is diabetic and poorly controlled hypertensive, think is reasonable to keep him overnight for repeat troponins and potentially further diagnostic testing as considered appropriate by the admitting team.    Discussed with JORDON ROSARIO and the  [facility]      Significant history that contributed to medical decision making : Diabetes, hypertension          Admission disposition: 10/8/2024  8:41 PM           Medical Decision Making      Disposition and Plan     Clinical Impression:  1. Abnormal EKG    2. Chest pain, atypical         Disposition:  Admit  10/8/2024  8:41 pm    Follow-up:  No follow-up provider specified.        Medications Prescribed:  Current Discharge Medication List              Supplementary Documentation:         Hospital Problems       Present on Admission  Date Reviewed: 11/9/2021            ICD-10-CM Noted POA    * (Principal) Abnormal EKG R94.31 10/8/2024 Unknown

## 2024-10-09 NOTE — ED QUICK NOTES
Orders for admission, patient is aware of plan and ready to go upstairs. Any questions, please call ED RN Therese at extension [phone].     Patient Covid vaccination status: Partially vaccinated     COVID Test Ordered in ED: Rapid SARS-CoV-2 by PCR- pending  COVID positive on 09/12/24    COVID Suspicion at Admission: N/A    Running Infusions:  None    Mental Status/LOC at time of transport: AO x3    Other pertinent information:     CIWA score: N/A   NIH score:  N/A

## 2024-10-09 NOTE — PLAN OF CARE
Assumed care of patient at 0730  Pt A&Ox4, on RA, NSR on tele  Pt denies any pain, n/v/d, or SOB at this time  Ambulatory, PIV LAC saline locked  ECHO this morning, resulted  Waiting on further orders from cards   Safety precautions maintained  All needs met at this time     1145- Pt BP noted 180/118, Diastolic steady above 100 since admission, MD notified     Problem: PAIN - ADULT  Goal: Verbalizes/displays adequate comfort level or patient's stated pain goal  Description: INTERVENTIONS:  - Encourage pt to monitor pain and request assistance  - Assess pain using appropriate pain scale  - Administer analgesics based on type and severity of pain and evaluate response  - Implement non-pharmacological measures as appropriate and evaluate response  - Consider cultural and social influences on pain and pain management  - Manage/alleviate anxiety  - Utilize distraction and/or relaxation techniques  - Monitor for opioid side effects  - Notify MD/LIP if interventions unsuccessful or patient reports new pain  - Anticipate increased pain with activity and pre-medicate as appropriate  Outcome: Progressing     Problem: CARDIOVASCULAR - ADULT  Goal: Maintains optimal cardiac output and hemodynamic stability  Description: INTERVENTIONS:  - Monitor vital signs, rhythm, and trends  - Monitor for bleeding, hypotension and signs of decreased cardiac output  - Evaluate effectiveness of vasoactive medications to optimize hemodynamic stability  - Monitor arterial and/or venous puncture sites for bleeding and/or hematoma  - Assess quality of pulses, skin color and temperature  - Assess for signs of decreased coronary artery perfusion - ex. Angina  - Evaluate fluid balance, assess for edema, trend weights  Outcome: Progressing  Goal: Absence of cardiac arrhythmias or at baseline  Description: INTERVENTIONS:  - Continuous cardiac monitoring, monitor vital signs, obtain 12 lead EKG if indicated  - Evaluate effectiveness of antiarrhythmic  and heart rate control medications as ordered  - Initiate emergency measures for life threatening arrhythmias  - Monitor electrolytes and administer replacement therapy as ordered  Outcome: Progressing

## 2024-10-09 NOTE — PROGRESS NOTES
[facility]   part of [facility]     Hospitalist Progress Note     Karel Sy Patient Status:  Observation    1975 MRN [id number]   Location [facility] 3NE-A Attending Shelley Anaya MD   Hosp Day # 0 PCP CARLITO HOWELL     Chief Complaint: chest and abdominal pain    Subjective:     Patient reports his pain is located on the right side of his chest and radiated to his RUQ,  he had diarrhea a few days prior which is now better    Objective:    Review of Systems:   A comprehensive review of systems was completed; pertinent positive and negatives stated in subjective.    Vital signs:  Temp:  [97.9 °F (36.6 °C)-98.6 °F (37 °C)] 98.1 °F (36.7 °C)  Pulse:  [67-84] 67  Resp:  [13-19] 17  BP: (159-196)/(106-125) 159/111  SpO2:  [96 %-99 %] 99 %    Physical Exam:    General: No acute distress  Respiratory: No wheezes, no rhonchi  Cardiovascular: S1, S2, regular rate and rhythm  Abdomen: Soft, Non-tender, non-distended, positive bowel sounds  Neuro: No new focal deficits.   Extremities: No edema      Diagnostic Data:    Labs:  Recent Labs   Lab 10/08/24  1933 10/09/24  0745   WBC 7.1 6.9   HGB 14.5 13.4   MCV 82.8 82.1   .0 213.0       Recent Labs   Lab 10/08/24  1933 10/09/24  0745   * 280*   BUN 11 12   CREATSERUM 1.19 1.13   CA 9.0 8.6*   ALB 3.4  --     140   K 3.6 3.9    104   CO2 27.0 27.0   ALKPHO 51  --    AST 15  --    ALT 27  --    BILT 0.4  --    TP 6.8  --        Estimated Creatinine Clearance: 99.2 mL/min (based on SCr of 1.13 mg/dL).    Recent Labs   Lab 10/08/24  1933 10/09/24  0021 10/09/24  0745   TROPHS 30 21 19       No results for input(s): \"PTP\", \"INR\" in the last 168 hours.         Microbiology    No results found for this visit on 10/08/24.      Imaging: Reviewed in Epic.    Medications:    allopurinol  300 mg Oral Daily    carvedilol  25 mg Oral BID with meals    gabapentin  300 mg Oral BID    pravastatin  40 mg Oral Nightly    losartan  100 mg  Oral Daily    enoxaparin  40 mg Subcutaneous Daily    insulin aspart  1-10 Units Subcutaneous TID AC and HS       Assessment & Plan:      #Chest pain  -ECHO reviewed showing EF of 45% with WMA-  plan for Angio tomorrow    #Abdominal pain   #Diarrhea  -check RUQ US  -check lipase    #Hyperlipidemia  -statin    #DM type II  -hyperglycemia protocol      Shelley Anaya MD    Supplementary Documentation:     Quality:  DVT Mechanical Prophylaxis:   SCDs,    DVT Pharmacologic Prophylaxis   Medication    enoxaparin (Lovenox) 40 MG/0.4ML SUBQ injection 40 mg                Code Status: Not on file  Perera: No urinary catheter in place  Perera Duration (in days):   Central line:    LIZETTE:     Discharge is dependent on: tory  At this point Mr. Sy is expected to be discharge to: home    The 21st Century Cures Act makes medical notes like these available to patients in the interest of transparency. Please be advised this is a medical document. Medical documents are intended to carry relevant information, facts as evident, and the clinical opinion of the practitioner. The medical note is intended as peer to peer communication and may appear blunt or direct. It is written in medical language and may contain abbreviations or verbiage that are unfamiliar.

## 2024-10-09 NOTE — CONSULTS
[facility]  Cardiology Consultation    Karel Sy Patient Status:  Observation    1975 MRN [id number]   Location [facility] 3NE-A Attending Shelley Anaya MD   Hosp Day # 0 PCP CARLITO HOWELL     Reason for Consultation:  Chest pain    History of Present Illness:  Karel Sy is a a(n) 49 year old male who presents with chest pain.     Chest pain has been ongoing for the past 3 months. Occurs episodically. Recently got worse. Radiates to his epigastrium.   Has limited mobility  Does endorse lightheadedness with exertion    Has been compliant with medications    Does not follow with cardiology.   Had a stress test a few years ago    Denies tobacco or alcohol use,     Drives a fork lift     History:  Past Medical History:    Congestive heart disease (HCC)    Diabetes (HCC)    Essential hypertension    High blood pressure     History reviewed. No pertinent surgical history.  History reviewed. No pertinent family history.   reports that he has never smoked. He has never used smokeless tobacco. He reports that he does not currently use alcohol. He reports that he does not currently use drugs.    Allergies:  Allergies[1]    Medications:    Current Facility-Administered Medications:     allopurinol (Zyloprim) tab 300 mg, 300 mg, Oral, Daily    carvedilol (Coreg) tab 25 mg, 25 mg, Oral, BID with meals    gabapentin (Neurontin) cap 300 mg, 300 mg, Oral, BID    pravastatin (Pravachol) tab 40 mg, 40 mg, Oral, Nightly    losartan (Cozaar) tab 100 mg, 100 mg, Oral, Daily    melatonin tab 3 mg, 3 mg, Oral, Nightly PRN    enoxaparin (Lovenox) 40 MG/0.4ML SUBQ injection 40 mg, 40 mg, Subcutaneous, Daily    acetaminophen (Tylenol Extra Strength) tab 500 mg, 500 mg, Oral, Q4H PRN    ondansetron (Zofran) 4 MG/2ML injection 4 mg, 4 mg, Intravenous, Q6H PRN    prochlorperazine (Compazine) 10 MG/2ML injection 5 mg, 5 mg, Intravenous, Q8H PRN    polyethylene glycol (PEG 3350) (Miralax) 17 g  oral packet 17 g, 17 g, Oral, Daily PRN    sennosides (Senokot) tab 17.2 mg, 17.2 mg, Oral, Nightly PRN    bisacodyl (Dulcolax) 10 MG rectal suppository 10 mg, 10 mg, Rectal, Daily PRN    fleet enema (Fleet) rectal enema 133 mL, 1 enema, Rectal, Once PRN    glucose (Dex4) 15 GM/59ML oral liquid 15 g, 15 g, Oral, Q15 Min PRN **OR** glucose (Glutose) 40% oral gel 15 g, 15 g, Oral, Q15 Min PRN **OR** glucose-vitamin C (Dex-4) chewable tab 4 tablet, 4 tablet, Oral, Q15 Min PRN **OR** dextrose 50% injection 50 mL, 50 mL, Intravenous, Q15 Min PRN **OR** glucose (Dex4) 15 GM/59ML oral liquid 30 g, 30 g, Oral, Q15 Min PRN **OR** glucose (Glutose) 40% oral gel 30 g, 30 g, Oral, Q15 Min PRN **OR** glucose-vitamin C (Dex-4) chewable tab 8 tablet, 8 tablet, Oral, Q15 Min PRN    insulin aspart (NovoLOG) 100 Units/mL FlexPen 1-10 Units, 1-10 Units, Subcutaneous, TID AC and HS    Review of Systems:  A comprehensive review of systems was negative if not otherwise mention in above HPI.    BP (!) 173/113 (BP Location: Left arm)   Pulse 69   Temp 98 °F (36.7 °C) (Oral)   Resp 14   Ht 6' 4\" (1.93 m)   Wt 250 lb (113.4 kg)   SpO2 98%   BMI 30.43 kg/m²   Temp (24hrs), Av.2 °F (36.8 °C), Min:97.9 °F (36.6 °C), Max:98.6 °F (37 °C)       Intake/Output Summary (Last 24 hours) at 10/9/2024 1112  Last data filed at 10/9/2024 0616  Gross per 24 hour   Intake --   Output 1 ml   Net -1 ml     Wt Readings from Last 3 Encounters:   10/08/24 250 lb (113.4 kg)   23 235 lb (106.6 kg)   22 265 lb (120.2 kg)       Physical Exam:   General: Alert and oriented x 3. No apparent distress. No respiratory or constitutional distress.  HEENT: Normocephalic, anicteric sclera, neck supple.  Neck: No JVD  Cardiac: Regular rate and rhythm. S1, S2 normal. No murmur  Lungs: Clear without wheezes, rales, rhonchi or dullness.   Abdomen: Soft, non-tender  Extremities: Without clubbing, cyanosis or edema.   Neurologic: Alert and oriented, normal  affect.  Skin: Warm and dry.     Laboratory Data:  Lab Results   Component Value Date    WBC 6.9 10/09/2024    HGB 13.4 10/09/2024    HCT 38.6 10/09/2024    .0 10/09/2024    CREATSERUM 1.13 10/09/2024    BUN 12 10/09/2024     10/09/2024    K 3.9 10/09/2024     10/09/2024    CO2 27.0 10/09/2024     10/09/2024    CA 8.6 10/09/2024    ALB 3.4 10/08/2024    ALKPHO 51 10/08/2024    BILT 0.4 10/08/2024    TP 6.8 10/08/2024    AST 15 10/08/2024    ALT 27 10/08/2024    PGLU 241 10/09/2024     Recent Labs   Lab 10/08/24  1933 10/09/24  0021 10/09/24  0745   TROPHS 30 21 19         Imaging:  Reviewed.    EKG: NSR, nonspecific ST changes, with new anterolateral T wave changes    Echo personally reviewed. New mild cardiomyopathy with WMA    Impression:    Atypical chest pain  New cardiomyopathy - undifferentiated. LVEF 45%  Uncontrolled diabetes  Hypertension    Recommendations:    Patient presents with chest pain. EKG shows nonspecific changes. Echo shows new reduced LVEF with WMA  He has significant risk factors for CAD including uncontrolled DM  Recommend ischemic evaluation. Discussed non invasive (stress test/cardiac CTA) versus invasive assessment with cardiac cath  Patient prefers definitive assessment and management of CAD with cardiac cath. Discussed with mother who also agrees    Cont with ASA, statin and beta blockers    Please keep NPO after midnight for cardiac cath tomorrow    Work up for possible gallbladder etiology versus GERD per primary service.     Patient was consented for left heart cardiac catheterization. The indications, risks, benefits and alternatives of the procedure were explained to the patient. The risk of coronary angiography, possible angioplasty, include, but are not limited to stroke, death, heart attack, coronary dissection requiring emergent CABG, hematoma, bleeding, allergic reaction to medications, vascular damage requiring surgical repair, kidney failure  requiring dialysis and others. Patient wishes to proceed.  Thank you for allowing me to participate in the care of your patient.    Laura Quezada MD  10/9/2024  11:12 AM         [1]   Allergies  Allergen Reactions    Shrimp SHORTNESS OF BREATH    Codeine NAUSEA AND VOMITING

## 2024-10-09 NOTE — ED QUICK NOTES
Spoke with charge RN, aware that EMS is at patient's bedside for transfer to [facility] for admission.   Aware that rapid COVID was sent but pending, patient was just COVID positive 09/12/24.   Aware that per house supervisor we are still ok to send the patient after his room was changed, tracking board shows room is currently being cleaned.

## 2024-10-10 ENCOUNTER — APPOINTMENT (OUTPATIENT)
Dept: INTERVENTIONAL RADIOLOGY/VASCULAR | Facility: HOSPITAL | Age: 49
End: 2024-10-10
Attending: INTERNAL MEDICINE
Payer: COMMERCIAL

## 2024-10-10 ENCOUNTER — APPOINTMENT (OUTPATIENT)
Dept: ULTRASOUND IMAGING | Facility: HOSPITAL | Age: 49
End: 2024-10-10
Attending: HOSPITALIST
Payer: COMMERCIAL

## 2024-10-10 LAB
GLUCOSE BLD-MCNC: 211 MG/DL (ref 70–99)
GLUCOSE BLD-MCNC: 252 MG/DL (ref 70–99)
GLUCOSE BLD-MCNC: 267 MG/DL (ref 70–99)
GLUCOSE BLD-MCNC: 300 MG/DL (ref 70–99)

## 2024-10-10 PROCEDURE — 99222 1ST HOSP IP/OBS MODERATE 55: CPT | Performed by: CLINICAL NURSE SPECIALIST

## 2024-10-10 PROCEDURE — B2111ZZ FLUOROSCOPY OF MULTIPLE CORONARY ARTERIES USING LOW OSMOLAR CONTRAST: ICD-10-PCS | Performed by: INTERNAL MEDICINE

## 2024-10-10 PROCEDURE — 76700 US EXAM ABDOM COMPLETE: CPT | Performed by: HOSPITALIST

## 2024-10-10 PROCEDURE — 99232 SBSQ HOSP IP/OBS MODERATE 35: CPT | Performed by: HOSPITALIST

## 2024-10-10 PROCEDURE — 4A023N7 MEASUREMENT OF CARDIAC SAMPLING AND PRESSURE, LEFT HEART, PERCUTANEOUS APPROACH: ICD-10-PCS | Performed by: INTERNAL MEDICINE

## 2024-10-10 RX ORDER — VERAPAMIL HYDROCHLORIDE 2.5 MG/ML
INJECTION, SOLUTION INTRAVENOUS
Status: COMPLETED
Start: 2024-10-10 | End: 2024-10-10

## 2024-10-10 RX ORDER — BLOOD SUGAR DIAGNOSTIC
STRIP MISCELLANEOUS
Qty: 100 STRIP | Refills: 6 | Status: SHIPPED | OUTPATIENT
Start: 2024-10-10

## 2024-10-10 RX ORDER — PANTOPRAZOLE SODIUM 40 MG/1
40 TABLET, DELAYED RELEASE ORAL
Status: DISCONTINUED | OUTPATIENT
Start: 2024-10-10 | End: 2024-10-11

## 2024-10-10 RX ORDER — INSULIN DEGLUDEC 100 U/ML
15 INJECTION, SOLUTION SUBCUTANEOUS NIGHTLY
Status: DISCONTINUED | OUTPATIENT
Start: 2024-10-10 | End: 2024-10-11

## 2024-10-10 RX ORDER — BLOOD-GLUCOSE METER
EACH MISCELLANEOUS
Qty: 1 KIT | Refills: 0 | Status: SHIPPED | OUTPATIENT
Start: 2024-10-10

## 2024-10-10 RX ORDER — AMLODIPINE BESYLATE 5 MG/1
TABLET ORAL
Status: COMPLETED
Start: 2024-10-10 | End: 2024-10-10

## 2024-10-10 RX ORDER — SODIUM CHLORIDE 9 MG/ML
INJECTION, SOLUTION INTRAVENOUS CONTINUOUS
OUTPATIENT
Start: 2024-10-10 | End: 2024-10-10

## 2024-10-10 RX ORDER — MIDAZOLAM HYDROCHLORIDE 1 MG/ML
INJECTION INTRAMUSCULAR; INTRAVENOUS
Status: COMPLETED
Start: 2024-10-10 | End: 2024-10-10

## 2024-10-10 RX ORDER — HYDRALAZINE HYDROCHLORIDE 20 MG/ML
10 INJECTION INTRAMUSCULAR; INTRAVENOUS EVERY 6 HOURS PRN
Status: DISCONTINUED | OUTPATIENT
Start: 2024-10-10 | End: 2024-10-11

## 2024-10-10 RX ORDER — ASPIRIN 81 MG/1
324 TABLET, CHEWABLE ORAL ONCE
Status: COMPLETED | OUTPATIENT
Start: 2024-10-10 | End: 2024-10-10

## 2024-10-10 RX ORDER — CARVEDILOL 12.5 MG/1
25 TABLET ORAL 2 TIMES DAILY WITH MEALS
Status: DISCONTINUED | OUTPATIENT
Start: 2024-10-10 | End: 2024-10-11

## 2024-10-10 RX ORDER — NITROGLYCERIN 20 MG/100ML
INJECTION INTRAVENOUS
Status: COMPLETED
Start: 2024-10-10 | End: 2024-10-10

## 2024-10-10 RX ORDER — LIDOCAINE HYDROCHLORIDE 10 MG/ML
INJECTION, SOLUTION EPIDURAL; INFILTRATION; INTRACAUDAL; PERINEURAL
Status: COMPLETED
Start: 2024-10-10 | End: 2024-10-10

## 2024-10-10 RX ORDER — METOPROLOL TARTRATE 1 MG/ML
5 INJECTION, SOLUTION INTRAVENOUS ONCE
Status: COMPLETED | OUTPATIENT
Start: 2024-10-10 | End: 2024-10-10

## 2024-10-10 RX ORDER — SODIUM CHLORIDE 9 MG/ML
INJECTION, SOLUTION INTRAVENOUS
Status: DISCONTINUED | OUTPATIENT
Start: 2024-10-10 | End: 2024-10-10 | Stop reason: HOSPADM

## 2024-10-10 RX ORDER — ACETAMINOPHEN 500 MG
500 TABLET ORAL EVERY 4 HOURS PRN
Status: DISCONTINUED | OUTPATIENT
Start: 2024-10-10 | End: 2024-10-11

## 2024-10-10 RX ORDER — AMLODIPINE BESYLATE 5 MG/1
5 TABLET ORAL DAILY
Status: DISCONTINUED | OUTPATIENT
Start: 2024-10-10 | End: 2024-10-11

## 2024-10-10 RX ORDER — HEPARIN SODIUM 5000 [USP'U]/ML
INJECTION, SOLUTION INTRAVENOUS; SUBCUTANEOUS
Status: COMPLETED
Start: 2024-10-10 | End: 2024-10-10

## 2024-10-10 RX ORDER — LANCETS 33 GAUGE
EACH MISCELLANEOUS
Qty: 100 EACH | Refills: 6 | Status: SHIPPED | OUTPATIENT
Start: 2024-10-10

## 2024-10-10 RX ORDER — HYDRALAZINE HYDROCHLORIDE 20 MG/ML
10 INJECTION INTRAMUSCULAR; INTRAVENOUS ONCE
Status: COMPLETED | OUTPATIENT
Start: 2024-10-10 | End: 2024-10-10

## 2024-10-10 RX ORDER — AMLODIPINE BESYLATE 5 MG/1
5 TABLET ORAL ONCE
Status: COMPLETED | OUTPATIENT
Start: 2024-10-10 | End: 2024-10-10

## 2024-10-10 NOTE — PLAN OF CARE
Assumed patient care at 1930  Pt oriented x 4   On tele, sinus rhythm, room air   Elevated Bp's x 1 dose lopressor given  Plan for CATH in am   Npo after MN   Pt updated on poc and verbalizes understanding.   Ambulatory ad valentina   Safety measures in place  Needs met at this time   Ongoing plan of care      0640; Pt's BP this morning was 185/118, HORACE Carrasco ordered another x 1 dose iv lopressor 5mg. 30mins after bp now 172/111. EFREN Gautam updated with orders to give coreg early.       Problem: PAIN - ADULT  Goal: Verbalizes/displays adequate comfort level or patient's stated pain goal  Description: INTERVENTIONS:  - Encourage pt to monitor pain and request assistance  - Assess pain using appropriate pain scale  - Administer analgesics based on type and severity of pain and evaluate response  - Implement non-pharmacological measures as appropriate and evaluate response  - Consider cultural and social influences on pain and pain management  - Manage/alleviate anxiety  - Utilize distraction and/or relaxation techniques  - Monitor for opioid side effects  - Notify MD/LIP if interventions unsuccessful or patient reports new pain  - Anticipate increased pain with activity and pre-medicate as appropriate  Outcome: Progressing

## 2024-10-10 NOTE — CONSULTS
[facility]  Diabetes Consult Note    Karel Sy Patient Status:  Observation    1975 MRN [id number]   Location [facility] 3NE-A Attending Shelley Anaya MD   Hosp Day # 0 PCP CARLITO HOWELL     Reason for Consult:   Management recommendations in setting of uncontrolled T2DM      Provider [facility]ing Consult[facility]  [facility]      Diagnosis:  Patient Active Problem List   Diagnosis    Acute renal insufficiency    Hypokalemia    C. difficile colitis    LUIS CARLOS (acute kidney injury) ([facility])    Primary hypertension    Diabetes mellitus type 2 in nonobese ([facility])    Abnormal EKG    Chest pain, atypical    Pure hypercholesterolemia         Medical History:  Past Medical History:    Congestive heart disease (HCC)    Diabetes ([facility])    Essential hypertension    High blood pressure     History reviewed. No pertinent surgical history.  History reviewed. No pertinent family history.      Diabetes history:  Type:  T2DM  Onset:   Family history of DM: grandmother and mother with T2DM      Allergies: Allergies[1]      Medications: Complete list reviewed. Active diabetes medications include degludec and aspart.      Labs:  Recent Labs   Lab 10/09/24  1158 10/09/24  1704 10/09/24  2109 10/10/24  0518 10/10/24  1210   PGLU 257* 198* 267* 267* 252*     Recent Labs     10/08/24  1933 10/08/24  2223 10/09/24  0021 10/09/24  0556 10/09/24  0745 10/09/24  1158 10/10/24  1210     --   --   --  140  --   --      --   --   --  104  --   --    CO2 27.0  --   --   --  27.0  --   --    BUN 11  --   --   --  12  --   --    CREATSERUM 1.19  --   --   --  1.13  --   --    A1C  --   --  11.5*  --   --   --   --    PGLU  --    < >  --    < >  --    < > 252*   CA 9.0  --   --   --  8.6*  --   --    ALB 3.4  --   --   --   --   --   --     < > = values in this interval not displayed.                    History of Present Illness: Karel Sy is a 49 year old male with a PMH of T2DM, HTN, HFrEF  (EF 45%) and HLD admitted 10/8/2024 with complaints of cough, fatigue, SOB, CP and loss of taste/smell with reported COVID+ on 9/12. ED evaluation revealed uncontrolled BP (190s/120s), non-specific EKG changes now s/p angiogram 10/10 and hyperglycemia (A1C 11/5%).      Assessment/Recommendations:  Upon interview today, patient states he was diagnosed with T2DM in 2014 by his PCP who has managed his anti-diabetic medications since that time; patient does state compliance with medications. 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 He also reports eating 1-2 high-carb, high-fat meals per day which are often fast food.     Explained to patient high A1C value on admission and the risk of complications due to DM when glucose remains uncontrolled. Recommended patient start basal insulin in addition to the bolus insulin he already administers, as well as attend education courses through [facility] to focus on nutritional changes that could help lower his A1C. Patient states he is okay with addition of basal insulin, but does not have the time or interest in participating in DM education. He also states it will be hard for him to change his diet due to time constraints. Finally, recommended patient follow-up with PCP within the next 1-2 weeks for DM management. Patient endorses understanding of plan of care.       Plan:  Inpatient recommendations -   Degludec = start at15u every day  May require upwards of 28u every day based on weight-based sensitivity  Aspart = given yesterday's AC/HS glucose values of 257, 198 & 267, increase to 1:30>140 & 1:10gm CHO  Discharge recommendations -   Initiate basal insulin (levemir or lantus are preferred drugs w/ patient's insurance), dose TBD  Increase metformin to 1000mg BID  Restart other home anti-diabetics =   Glipizide 10mg BID  NPH  insulin 16u TID  70/30 insulin 50u TID      Prescription Recommendations:  Traditional [place] Medicaid:  Insulin: Humalog, Levemir, Lantus   Supplies:  BD pen needles ([employer])  Glucometer:  One Touch Ultra ([employer])  CGM:  Dexcom G6 & 7 (with Prior Auth)  Insulin Pump:  Omni Pod (with Prior Auth)      Provider F/U Recommendations:  Transition of Care [facility]  PCP - Dr. Purvis      A total of 60 minutes were spent with the patient, 100% was spent counseling and coordinating care for T2 diabetes self-management including nutrition, exercise, blood glucose monitoring, insulin administration, medications, treatment options, follow-up coordination and resources.    Suki Phillips, APRN  10/10/2024  1:47 PM       [1]   Allergies  Allergen Reactions    Shrimp SHORTNESS OF BREATH    Codeine NAUSEA AND VOMITING

## 2024-10-10 NOTE — PLAN OF CARE
Pt A&Ox4  RA  BP remains elevated. MDs notified. No new orders.   Tele- NSR  Cath completed  US abd pending  NPO for US  Staff will continue to monitor       Problem: PAIN - ADULT  Goal: Verbalizes/displays adequate comfort level or patient's stated pain goal  Description: INTERVENTIONS:  - Encourage pt to monitor pain and request assistance  - Assess pain using appropriate pain scale  - Administer analgesics based on type and severity of pain and evaluate response  - Implement non-pharmacological measures as appropriate and evaluate response  - Consider cultural and social influences on pain and pain management  - Manage/alleviate anxiety  - Utilize distraction and/or relaxation techniques  - Monitor for opioid side effects  - Notify MD/LIP if interventions unsuccessful or patient reports new pain  - Anticipate increased pain with activity and pre-medicate as appropriate  Outcome: Progressing     Problem: CARDIOVASCULAR - ADULT  Goal: Maintains optimal cardiac output and hemodynamic stability  Description: INTERVENTIONS:  - Monitor vital signs, rhythm, and trends  - Monitor for bleeding, hypotension and signs of decreased cardiac output  - Evaluate effectiveness of vasoactive medications to optimize hemodynamic stability  - Monitor arterial and/or venous puncture sites for bleeding and/or hematoma  - Assess quality of pulses, skin color and temperature  - Assess for signs of decreased coronary artery perfusion - ex. Angina  - Evaluate fluid balance, assess for edema, trend weights  Outcome: Progressing  Goal: Absence of cardiac arrhythmias or at baseline  Description: INTERVENTIONS:  - Continuous cardiac monitoring, monitor vital signs, obtain 12 lead EKG if indicated  - Evaluate effectiveness of antiarrhythmic and heart rate control medications as ordered  - Initiate emergency measures for life threatening arrhythmias  - Monitor electrolytes and administer replacement therapy as ordered  Outcome: Progressing

## 2024-10-10 NOTE — PROCEDURES
[facility]   part of [facility]    Cardiac Cath Procedure Note  Karel Sy Patient Status:  Observation    1975 MRN [id number]   Location [facility] INTERVENTIONAL SUITES Attending Shelley Anaya MD   Hosp Day # 0 PCP CARLITO HOWELL       Cardiologist: Laura Quezada MD  Primary Proceduralist: Laura Quezada MD  Procedure Performed: [facility]  Date of Procedure: 10/10/2024   Indication: Cardiomyopathy    Consent:   Informed written consent was obtained from the patient after risk benefits and alternative explained the patient.  Patient agreed to proceed    Sedation  IV conscious sedation was achieved with Versed and fentanyl.  It was administered under my direct supervision.  Hemodynamic monitoring took place throughout the entire procedure by myself in the Cath Lab staff.  3mg of Versed and 75mcg of Fentanyl were given.  Start Time: 8:28 End Time: 8:50      Description of the procedure: After written informed consent was obtained from the patient, patient was brought to the cardiac catheterization laboratory in a stable condition and fasting state.  Patient was prepped and draped in the usual sterile fashion.  IV conscious sedation was achieved with Versed and fentanyl.  Lidocaine 1% was used to infiltrate the right radial artery for local anesthesia and a 6 Polish introducer sheath was inserted into the right radial artery.      A tiger cathter was used to engage the left main and vessel was visualized.  A tiger cathter was used to engage the right coronary artery and vessel was visualized.  A tiger catheter was used to cross the aortic valve to record LV pressures. Catheter was pulled back into the aorta to evaluate gradients.     Specimen sent to: No specimen collected  Estimated blood loss: 10 cc  Closure:  TR band    Left Ventriculography and hemodynamics:   LV EDP: 10 mmHg  No gradient across aortic valve    Coronary Angiography  RCA:  Large, tortuous. No significant obstructive  CAD  Left main:  Large, no significant obstructive CAD  Left anterior descending:  Large, no significant obstructive CAD  Circumflex:  Small, non dominant    Assessment:  Non-ischemic cardiomyopathy    Recommendations:    Recommend goal directed medial management for heart failure  Routine post cath care.   Finding of cardiac catheterization and further management was discussed in detail with patient and family    Specimen sent to: No specimen collected  Estimated blood loss: 10 cc  Closure:  TR mikael Quezada MD  10/10/24

## 2024-10-10 NOTE — PROGRESS NOTES
Notified by RN pt hypertensive. RN stated pt hypertensive throughout the day. Most recent /109. Lopressor 5 mg IVP ordered.       Addendum 0530:  Per RN, pt hypertensive this morning. Last /118. Lopressor 5 mg IVP ordered.

## 2024-10-10 NOTE — PROGRESS NOTES
[facility]   part of [facility]     Hospitalist Progress Note     Karel Sy Patient Status:  Observation    1975 MRN [id number]   Location [facility] 3NE-A Attending Shelley Anaya MD   Hosp Day # 0 PCP CARLITO HOWELL     Chief Complaint: chest and abdominal pain    Subjective:     No further cheat pain ,   has a headache bc he's hungry    Objective:    Review of Systems:   A comprehensive review of systems was completed; pertinent positive and negatives stated in subjective.    Vital signs:  Temp:  [97.9 °F (36.6 °C)-98.6 °F (37 °C)] 97.9 °F (36.6 °C)  Pulse:  [64-96] 70  Resp:  [7-23] 18  BP: (140-187)/(100-129) 166/124  SpO2:  [93 %-99 %] 97 %    Physical Exam:    General: No acute distress  Respiratory: No wheezes, no rhonchi  Cardiovascular: S1, S2, regular rate and rhythm  Abdomen: Soft, Non-tender, non-distended, positive bowel sounds  Neuro: No new focal deficits.   Extremities: No edema      Diagnostic Data:    Labs:  Recent Labs   Lab 10/08/24  1933 10/09/24  0745   WBC 7.1 6.9   HGB 14.5 13.4   MCV 82.8 82.1   .0 213.0       Recent Labs   Lab 10/08/24  1933 10/09/24  0745   * 280*   BUN 11 12   CREATSERUM 1.19 1.13   CA 9.0 8.6*   ALB 3.4  --     140   K 3.6 3.9    104   CO2 27.0 27.0   ALKPHO 51  --    AST 15  --    ALT 27  --    BILT 0.4  --    TP 6.8  --        Estimated Creatinine Clearance: 99.2 mL/min (based on SCr of 1.13 mg/dL).    Recent Labs   Lab 10/08/24  1933 10/09/24  0021 10/09/24  0745   TROPHS 30 21 19       No results for input(s): \"PTP\", \"INR\" in the last 168 hours.         Microbiology    No results found for this visit on 10/08/24.      Imaging: Reviewed in Epic.    Medications:    carvedilol  25 mg Oral BID with meals    pantoprazole  40 mg Oral QAM AC    insulin aspart  1-20 Units Subcutaneous TID CC and HS    insulin degludec  15 Units Subcutaneous Nightly    hydrALAzine  10 mg Intravenous Once    spironolactone  25 mg  Oral Daily    allopurinol  300 mg Oral Daily    gabapentin  300 mg Oral BID    pravastatin  40 mg Oral Nightly    losartan  100 mg Oral Daily    enoxaparin  40 mg Subcutaneous Daily    insulin aspart  1-10 Units Subcutaneous TID AC and HS       Assessment & Plan:      #Chest pain  -ECHO reviewed showing EF of 45% with WMA  -s/p angio showing nonischemic cardiomyopathy  -start GDMT    #Abdominal pain   #Diarrhea  -check RUQ US  -lipase and LFTs normal    #Hyperlipidemia  -statin    #DM type II  -hyperglycemia protocol  -uncontrolled  -insulin being adjusted      Shelley Anaya MD    Supplementary Documentation:     Quality:  DVT Mechanical Prophylaxis:   SCDs,    DVT Pharmacologic Prophylaxis   Medication    enoxaparin (Lovenox) 40 MG/0.4ML SUBQ injection 40 mg                Code Status: Not on file  Perera: No urinary catheter in place  Perera Duration (in days):   Central line:    LIZETTE:     Discharge is dependent on: tory  At this point Mr. Sy is expected to be discharge to: home    The 21st Century Cures Act makes medical notes like these available to patients in the interest of transparency. Please be advised this is a medical document. Medical documents are intended to carry relevant information, facts as evident, and the clinical opinion of the practitioner. The medical note is intended as peer to peer communication and may appear blunt or direct. It is written in medical language and may contain abbreviations or verbiage that are unfamiliar.

## 2024-10-10 NOTE — PROGRESS NOTES
Pt s/p LHC via R radial artery with TR Band placed to site-air released per protocol with no bleeding or swelling noted throughout recovery period. VSS. Dr Quezada notified of high /117-order received for Amlodipine 5mg. Pt denied pain. After recovery complete, report called to Kathryn MOJICA. Pt transferred back to 3rd floor and site assessed wih RN

## 2024-10-11 VITALS
TEMPERATURE: 98 F | WEIGHT: 250 LBS | SYSTOLIC BLOOD PRESSURE: 158 MMHG | HEIGHT: 76 IN | RESPIRATION RATE: 34 BRPM | OXYGEN SATURATION: 97 % | BODY MASS INDEX: 30.44 KG/M2 | HEART RATE: 68 BPM | DIASTOLIC BLOOD PRESSURE: 114 MMHG

## 2024-10-11 LAB
GLUCOSE BLD-MCNC: 212 MG/DL (ref 70–99)
GLUCOSE BLD-MCNC: 226 MG/DL (ref 70–99)
GLUCOSE BLD-MCNC: 251 MG/DL (ref 70–99)

## 2024-10-11 PROCEDURE — 99232 SBSQ HOSP IP/OBS MODERATE 35: CPT | Performed by: HOSPITALIST

## 2024-10-11 RX ORDER — BLOOD SUGAR DIAGNOSTIC
STRIP MISCELLANEOUS
Qty: 100 STRIP | Refills: 6 | Status: SHIPPED | OUTPATIENT
Start: 2024-10-11

## 2024-10-11 RX ORDER — AMLODIPINE BESYLATE 5 MG/1
5 TABLET ORAL DAILY
Qty: 30 TABLET | Refills: 0 | Status: SHIPPED | OUTPATIENT
Start: 2024-10-12

## 2024-10-11 RX ORDER — HYDRALAZINE HYDROCHLORIDE 50 MG/1
50 TABLET, FILM COATED ORAL EVERY 6 HOURS SCHEDULED
Qty: 120 TABLET | Refills: 0 | Status: SHIPPED | OUTPATIENT
Start: 2024-10-11

## 2024-10-11 RX ORDER — HYDRALAZINE HYDROCHLORIDE 50 MG/1
50 TABLET, FILM COATED ORAL EVERY 6 HOURS SCHEDULED
Status: DISCONTINUED | OUTPATIENT
Start: 2024-10-11 | End: 2024-10-11

## 2024-10-11 RX ORDER — BLOOD-GLUCOSE METER
EACH MISCELLANEOUS
Qty: 1 KIT | Refills: 0 | Status: SHIPPED | OUTPATIENT
Start: 2024-10-11

## 2024-10-11 RX ORDER — LANCETS 33 GAUGE
EACH MISCELLANEOUS
Qty: 100 EACH | Refills: 6 | Status: SHIPPED | OUTPATIENT
Start: 2024-10-11

## 2024-10-11 NOTE — DIETARY NOTE
[facility]   part of [facility]   CLINICAL NUTRITION    Karel Sy     Admitting diagnosis:  Abnormal EKG [R94.31]  Chest pain, atypical [R07.89]    Ht: 193 cm (6' 4\")  Wt: 113.4 kg (250 lb).   Body mass index is 30.43 kg/m².  IBW: 97.3 kg    Wt Readings from Last 6 Encounters:   10/08/24 113.4 kg (250 lb)   11/04/23 106.6 kg (235 lb)   11/12/22 120.2 kg (265 lb)   10/10/22 120.2 kg (265 lb)   10/01/22 120.2 kg (265 lb)   11/11/21 114.4 kg (252 lb 3.2 oz)        Labs/Meds reviewed    Diet:       Procedures    Carbohydrate controlled diet 1800 kcal/60 grams; Is Patient on Accuchecks? Yes; Misc Restriction: Cardiac     Percent Meals Eaten (last 3 days)       None          Pt chart reviewed d/t A1C.  Patient reports poor appetite at this time.  No intake today per pt - no intake documented since admission  No reported diarrhea, emesis, or constipation.   No significant weight changes noted.     PMH includes DM, HTN, HF. Pt seen for A1C 11.5%.  S/p Cardiac Cath on 10/10. Pt seen lying in bed, no family at bedside.  Pt with poor appetite 2/2 HA.  Denies n/v/d. Appears well nourished.  No wt loss per pt.  Pt diet is typically 1 meal/day - usually fast food, with snacking occasionally (chips, cookies, etc).  Pt alternates between regular soda and water.  Advised 3 meals/day, limiting carb sources/portion control, and avoiding sugar sweetened beverages.  Provided examples of low/no carb snacks.  Discussed making small, attainable goals. Handouts on carb counting and label reading left at bedside along with contact info for questions post discharge.  Allq uestions answered at time of visit.     Patient is at low nutrition risk at this time.    Please consult if patient status changes or nutrition issues arise.    Sena Molina RD, LDN, [facility]  Clinical Dietitian  Phone [phone]

## 2024-10-11 NOTE — PROGRESS NOTES
Progress Note  Karel Sy Patient Status:  Observation    1975 MRN [id number]   Location [facility] 3NE-A Attending Shelley Anaya MD   Hosp Day # 0 PCP CARLITO HOWELL     Subjective:  No acute events overnight.  Resting in bed this morning, denies any cardiac symptoms currently.  BP still elevated 150/90s-will further adjust meds.    Objective:  BP (!) 150/96 (BP Location: Left arm)   Pulse 68   Temp 98.2 °F (36.8 °C) (Oral)   Resp 12   Ht 6' 4\" (1.93 m)   Wt 250 lb (113.4 kg)   SpO2 98%   BMI 30.43 kg/m²     Telemetry: SR, HR 70s      Intake/Output:  No intake or output data in the 24 hours ending 10/11/24 0722    Last 3 Weights   10/08/24 2211 250 lb (113.4 kg)   10/08/24 1718 276 lb (125.2 kg)   23 1834 235 lb (106.6 kg)   22 1855 265 lb (120.2 kg)       Labs:  Recent Labs   Lab 10/08/24  1933 10/09/24  0745   * 280*   BUN 11 12   CREATSERUM 1.19 1.13   EGFRCR 76 81   CA 9.0 8.6*    140   K 3.6 3.9    104   CO2 27.0 27.0     Recent Labs   Lab 10/08/24  1933 10/09/24  0745   RBC 5.12 4.70   HGB 14.5 13.4   HCT 42.4 38.6*   MCV 82.8 82.1   MCH 28.3 28.5   MCHC 34.2 34.7   RDW 14.0 13.9   NEPRELIM 3.70 3.28   WBC 7.1 6.9   .0 213.0         Recent Labs   Lab 10/08/24  1933 10/09/24  0021 10/09/24  0745   TROPHS 30 21 19       Review of Systems   Constitutional: Negative.   Cardiovascular: Negative.    Respiratory: Negative.         Physical Exam:    Gen: alert, oriented x 3, NAD  Heent: pupils equal, reactive. Mucous membranes moist.   Neck: no jvd  Cardiac: regular rate and rhythm, normal S1,S2, no murmur, gallop or rub   Lungs: CTA  Abd: soft, NT/ND +bs  Ext: no edema, right wrist c,d,i  Skin: Warm, dry  Neuro: No focal deficits        Medications:     carvedilol  25 mg Oral BID with meals    pantoprazole  40 mg Oral QAM AC    insulin aspart  1-20 Units Subcutaneous TID CC and HS    insulin degludec  15 Units Subcutaneous Nightly    amLODIPine   5 mg Oral Daily    spironolactone  25 mg Oral Daily    allopurinol  300 mg Oral Daily    gabapentin  300 mg Oral BID    pravastatin  40 mg Oral Nightly    losartan  100 mg Oral Daily    enoxaparin  40 mg Subcutaneous Daily    insulin aspart  1-10 Units Subcutaneous TID AC and HS     Assessment:  Chest pain  S/P LHC 10/10/24 showed nonischemic cardiomyopathy   Echo 10/9/24 LVEF 40-45%, mild-mod diffuse hypokinesis  On statin, bb  New nonischemic cardiomyopathy, LVEF 40-45%  On carvedilol, losartan, venecia   HTN, BP still elevated 150/90s  On carvedilol 25 mg bid, losartan 100 mg daily, amlodipine 5 mg daily, venecia 25 mg daily   Uncontrolled DM2, A1C 11.5%    Plan:  Denies cardiac symptoms.  LHC showed non-ischemic cardiomyopathy-continue medical management and GDMT titration-will add SGLT2i prior dc.  Will up titrate BP meds to keep SBP<160.  Tentative plan to dc home today pending BP stability.   Will schedule op visit with Dr Quezada in 1 week.     Plan of care discussed with patient, RN.    Alia Mcneill, LUIS  10/11/2024  7:22 AM  [phone]

## 2024-10-11 NOTE — PROGRESS NOTES
[facility]   part of [facility]     Hospitalist Progress Note     Karel Sy Patient Status:  Observation    1975 MRN [id number]   Location [facility] 3NE-A Attending Shelley Anaya MD   Hosp Day # 0 PCP CARLITO HOWELL     Chief Complaint: chest and abdominal pain    Subjective:     No new complaints    Objective:    Review of Systems:   A comprehensive review of systems was completed; pertinent positive and negatives stated in subjective.    Vital signs:  Temp:  [97.9 °F (36.6 °C)-98.8 °F (37.1 °C)] 98.6 °F (37 °C)  Pulse:  [65-78] 66  Resp:  [12-21] 13  BP: (146-174)/() 162/109  SpO2:  [97 %-99 %] 99 %    Physical Exam:    General: No acute distress  Respiratory: No wheezes, no rhonchi  Cardiovascular: S1, S2, regular rate and rhythm  Abdomen: Soft, Non-tender, non-distended, positive bowel sounds  Neuro: No new focal deficits.   Extremities: No edema      Diagnostic Data:    Labs:  Recent Labs   Lab 10/08/24  1933 10/09/24  0745   WBC 7.1 6.9   HGB 14.5 13.4   MCV 82.8 82.1   .0 213.0       Recent Labs   Lab 10/08/24  1933 10/09/24  0745   * 280*   BUN 11 12   CREATSERUM 1.19 1.13   CA 9.0 8.6*   ALB 3.4  --     140   K 3.6 3.9    104   CO2 27.0 27.0   ALKPHO 51  --    AST 15  --    ALT 27  --    BILT 0.4  --    TP 6.8  --        Estimated Creatinine Clearance: 99.2 mL/min (based on SCr of 1.13 mg/dL).    Recent Labs   Lab 10/08/24  1933 10/09/24  0021 10/09/24  0745   TROPHS 30 21 19       No results for input(s): \"PTP\", \"INR\" in the last 168 hours.         Microbiology    No results found for this visit on 10/08/24.      Imaging: Reviewed in Epic.    Medications:    carvedilol  25 mg Oral BID with meals    pantoprazole  40 mg Oral QAM AC    insulin aspart  1-20 Units Subcutaneous TID CC and HS    insulin degludec  15 Units Subcutaneous Nightly    amLODIPine  5 mg Oral Daily    spironolactone  25 mg Oral Daily    allopurinol  300 mg Oral Daily     gabapentin  300 mg Oral BID    pravastatin  40 mg Oral Nightly    losartan  100 mg Oral Daily    enoxaparin  40 mg Subcutaneous Daily    insulin aspart  1-10 Units Subcutaneous TID AC and HS       Assessment & Plan:      #Chest pain  -ECHO reviewed showing EF of 45% with WMA  -s/p angio showing nonischemic cardiomyopathy  -start GDMT    #Hypertension  -uncontrolled  -on Carvedilol, amlodipine, spironolactone, losartan    #Abdominal pain   #Diarrhea  -resolved  -US negative for GB disease  -lipase and LFTs normal    #Hyperlipidemia  -statin    #DM type II  -hyperglycemia protocol  -uncontrolled  -insulin being adjusted      Shelley Anaya MD    Supplementary Documentation:     Quality:  DVT Mechanical Prophylaxis:   SCDs,    DVT Pharmacologic Prophylaxis   Medication    enoxaparin (Lovenox) 40 MG/0.4ML SUBQ injection 40 mg                Code Status: Not on file  Perera: No urinary catheter in place  Perera Duration (in days):   Central line:    LIZETTE:     Discharge is dependent on: tory  At this point Mr. Sy is expected to be discharge to: home    The 21st Century Cures Act makes medical notes like these available to patients in the interest of transparency. Please be advised this is a medical document. Medical documents are intended to carry relevant information, facts as evident, and the clinical opinion of the practitioner. The medical note is intended as peer to peer communication and may appear blunt or direct. It is written in medical language and may contain abbreviations or verbiage that are unfamiliar.

## 2024-10-11 NOTE — PLAN OF CARE
Assumed care at 0730  Patient is alert and oriented x4  Room air  NSR on tele.  Cardiac electrolyte protcol  Lovenox for VTE  PRN HTN meds given, if BP controlled, possible dc today.  QID accuchecks  Will continue current plan of care.   Problem: PAIN - ADULT  Goal: Verbalizes/displays adequate comfort level or patient's stated pain goal  Description: INTERVENTIONS:  - Encourage pt to monitor pain and request assistance  - Assess pain using appropriate pain scale  - Administer analgesics based on type and severity of pain and evaluate response  - Implement non-pharmacological measures as appropriate and evaluate response  - Consider cultural and social influences on pain and pain management  - Manage/alleviate anxiety  - Utilize distraction and/or relaxation techniques  - Monitor for opioid side effects  - Notify MD/LIP if interventions unsuccessful or patient reports new pain  - Anticipate increased pain with activity and pre-medicate as appropriate  Outcome: Progressing     Problem: CARDIOVASCULAR - ADULT  Goal: Maintains optimal cardiac output and hemodynamic stability  Description: INTERVENTIONS:  - Monitor vital signs, rhythm, and trends  - Monitor for bleeding, hypotension and signs of decreased cardiac output  - Evaluate effectiveness of vasoactive medications to optimize hemodynamic stability  - Monitor arterial and/or venous puncture sites for bleeding and/or hematoma  - Assess quality of pulses, skin color and temperature  - Assess for signs of decreased coronary artery perfusion - ex. Angina  - Evaluate fluid balance, assess for edema, trend weights  Outcome: Progressing  Goal: Absence of cardiac arrhythmias or at baseline  Description: INTERVENTIONS:  - Continuous cardiac monitoring, monitor vital signs, obtain 12 lead EKG if indicated  - Evaluate effectiveness of antiarrhythmic and heart rate control medications as ordered  - Initiate emergency measures for life threatening arrhythmias  - Monitor  electrolytes and administer replacement therapy as ordered  Outcome: Progressing

## 2024-10-11 NOTE — PLAN OF CARE
Assumed pt care at 1930  Pt is A&Ox4, following commands.   Pt on room air, VSS.  NSR  PRN tylenol for headache  Pt up ad valentina.  Pt on carb control diet. Tolerating diet.   QID accucheck  PIV saline locked  Monitoring bp's  Bed in lowest position, call light in reach.     Problem: PAIN - ADULT  Goal: Verbalizes/displays adequate comfort level or patient's stated pain goal  Description: INTERVENTIONS:  - Encourage pt to monitor pain and request assistance  - Assess pain using appropriate pain scale  - Administer analgesics based on type and severity of pain and evaluate response  - Implement non-pharmacological measures as appropriate and evaluate response  - Consider cultural and social influences on pain and pain management  - Manage/alleviate anxiety  - Utilize distraction and/or relaxation techniques  - Monitor for opioid side effects  - Notify MD/LIP if interventions unsuccessful or patient reports new pain  - Anticipate increased pain with activity and pre-medicate as appropriate  Outcome: Progressing     Problem: CARDIOVASCULAR - ADULT  Goal: Maintains optimal cardiac output and hemodynamic stability  Description: INTERVENTIONS:  - Monitor vital signs, rhythm, and trends  - Monitor for bleeding, hypotension and signs of decreased cardiac output  - Evaluate effectiveness of vasoactive medications to optimize hemodynamic stability  - Monitor arterial and/or venous puncture sites for bleeding and/or hematoma  - Assess quality of pulses, skin color and temperature  - Assess for signs of decreased coronary artery perfusion - ex. Angina  - Evaluate fluid balance, assess for edema, trend weights  Outcome: Progressing  Goal: Absence of cardiac arrhythmias or at baseline  Description: INTERVENTIONS:  - Continuous cardiac monitoring, monitor vital signs, obtain 12 lead EKG if indicated  - Evaluate effectiveness of antiarrhythmic and heart rate control medications as ordered  - Initiate emergency measures for life  threatening arrhythmias  - Monitor electrolytes and administer replacement therapy as ordered  Outcome: Progressing

## 2024-10-13 NOTE — DISCHARGE SUMMARY
[facility]     Karel Sy Patient Status:  Observation    1975 MRN [id number]   Location [facility] 3NE-A Attending No att. providers found   Hosp Day # 0 PCP CARLITO HOWELL     Date of Admission: 10/8/2024  Date of Discharge:  10/11/2024     Discharge Disposition: Home or Self Care    Discharge Diagnosis:  Chest pain  Uncontrolled hypertension  Hyperlipidemia  Diabetes mellitus type 2    History of Present Illness:   Karel Sy is a 49 year old male with history of type 2 diabetes, congestive heart failure, hypertension presents to the emergency room with right-sided chest pain.  Chest pain only hurts when he coughs there is no ongoing cough since he was diagnosed with COVID over a month ago.  Patient reports he was diaphoretic and short of breath.  No dizziness, lightheadedness.  Patient reported some nausea but no vomiting.  No palpitations, dizziness, lightheadedness, or syncope.  No fevers, chills, diarrhea or constipation.  No orthopnea or PND     Brief Synopsis:   Patient is a 49-year-old man admitted with chest pain.  He was noted to have uncontrolled hypertension.  His echo showed mildly reduced ejection fraction.  He underwent an angiogram which showed nonobstructive disease.  He was placed on goal-directed medical therapy and discharged home in good condition    Lace+ Score: 36  59-90 High Risk  29-58 Medium Risk  0-28   Low Risk       TCM Follow-Up Recommendation:  LACE 29-58: Moderate Risk of readmission after discharge from the hospital.      Procedures during hospitalization:   Cardiac angiogram    Consultants:  Cardiology    Discharge Medication List:     Discharge Medications        START taking these medications        Instructions Prescription details   amLODIPine 5 MG Tabs  Commonly known as: Norvasc      Take 1 tablet (5 mg total) by mouth daily.   Quantity: 30 tablet  Refills: 0     hydrALAZINE 50 MG Tabs  Commonly known as: Apresoline       Take 1 tablet (50 mg total) by mouth every 6 (six) hours.   Quantity: 120 tablet  Refills: 0     OneTouch Delica Lancets 33G Misc      Test blood sugar 4 times per day.   Quantity: 100 each  Refills: 6     OneTouch Delica Lancets 33G Misc      Test blood sugar three times per day.   Quantity: 100 each  Refills: 6     OneTouch Verio Flex System w/Device Kit      Test blood sugar 4 times per day.   Quantity: 1 kit  Refills: 0     OneTouch Verio Flex System w/Device Kit      Test blood sugar three times per day.   Quantity: 1 kit  Refills: 0     OneTouch Verio Strp      Test blood sugar 4 times per day.   Quantity: 100 strip  Refills: 6     OneTouch Verio Strp      Test blood sugar three  times per day.   Quantity: 100 strip  Refills: 6            CONTINUE taking these medications        Instructions Prescription details   allopurinol 300 MG Tabs  Commonly known as: Zyloprim      Take 1 tablet (300 mg total) by mouth daily.   Refills: 0     allopurinol 100 MG Tabs  Commonly known as: Zyloprim      Take 1 tablet (100 mg total) by mouth daily.   Quantity: 30 tablet  Refills: 0     carvedilol 25 MG Tabs  Commonly known as: Coreg      Take 1 tablet (25 mg total) by mouth 2 (two) times daily with meals.   Refills: 0     gabapentin 300 MG Caps  Commonly known as: Neurontin      Take 1 capsule (300 mg total) by mouth in the morning and 1 capsule (300 mg total) before bedtime.   Refills: 0     glipiZIDE 10 MG Tabs  Commonly known as: Glucotrol      Take 1 tablet (10 mg total) by mouth 2 (two) times daily before meals.   Refills: 0     insulin aspart 100 UNIT/ML Soct  Commonly known as: NovoLOG      Inject into the skin once.   Refills: 0     insulin lispro 100 UNIT/ML Soln  Commonly known as: Humalog      Inject into the skin 3 (three) times daily before meals.   Refills: 0     insulin NPH & regular 70/30 (70-30) 100 Units/mL Susp  Commonly known as: NovoLIN 70/30      Inject into the skin 2 (two) times daily before meals.    Refills: 0     metFORMIN 850 MG Tabs  Commonly known as: Glucophage      Take 1 tablet (850 mg total) by mouth 2 (two) times daily with meals.   Refills: 0     pravastatin 20 MG Tabs  Commonly known as: Pravachol      Take 2 tablets (40 mg total) by mouth nightly.   Refills: 0     valsartan 160 MG Tabs  Commonly known as: Diovan      Take 1 tablet (160 mg total) by mouth daily.   Refills: 0            STOP taking these medications      cyclobenzaprine 10 MG Tabs  Commonly known as: Flexeril        dilTIAZem  MG Cp24  Commonly known as: CardIZEM CD        methylPREDNISolone 4 MG Tbpk  Commonly known as: Medrol                  Where to Get Your Medications        These medications were sent to [id number] - [place], [place] - [address] AT [facility], [phone], [phone] [address] [place] [address]      Hours: 24-hours Phone: [phone]   amLODIPine 5 MG Tabs  hydrALAZINE 50 MG Tabs  OneTouch Delica Lancets 33G Misc  OneTouch Delica Lancets 33G Misc  OneTouch Verio Flex System w/Device Kit  OneTouch Verio Flex System w/Device Kit  OneTouch Verio Strp  OneTouch Verio Strp         [place] reviewed: n[place]    Follow-up appointment:   Kelsey Purvis  [address] [place] [address] [phone]    Schedule an appointment as soon as possible for a visit in 1 week(s)  Diabetes / Hosptial follow up    Laura Quezada MD  [address]  [facility] [address] [phone]    Schedule an appointment as soon as possible for a visit in 1 week(s)      Appointments for Next 30 Days 10/13/2024 - 11/12/2024      None            Vital signs:       Physical Exam:    General: No acute distress   Lungs: clear to auscultation  Cardiovascular: S1, S2  Abdomen: Soft      -----------------------------------------------------------------------------------------------  PATIENT DISCHARGE INSTRUCTIONS: See electronic chart    Shelley Anaya,  MD    Total time spent on discharge plannin minutes     The 21st Century Cures Act makes medical notes like these available to patients in the interest of transparency. Please be advised this is a medical document. Medical documents are intended to carry relevant information, facts as evident, and the clinical opinion of the practitioner. The medical note is intended as peer to peer communication and may appear blunt or direct. It is written in medical language and may contain abbreviations or verbiage that are unfamiliar.

## 2024-10-14 ENCOUNTER — PATIENT OUTREACH (OUTPATIENT)
Dept: CASE MANAGEMENT | Age: 49
End: 2024-10-14

## 2024-10-14 NOTE — PROGRESS NOTES
Hospital follow up.    Last A1C Value: 11.5% Date: [10/9/2024]    Kelsey Purvis M.D.  [facility] professional [address]  [address]  [place] [address]  [phone] - number not active  [phone]  No availability  Office will contact the patient.    No answer on return call; left a message with appointment information and callback information.    Closing encounter.

## 2024-10-28 DIAGNOSIS — R07.89 CHEST PAIN, ATYPICAL: ICD-10-CM

## 2024-10-28 DIAGNOSIS — I42.0 CARDIOMYOPATHY, DILATED  (CMD): Primary | ICD-10-CM

## 2024-11-03 ENCOUNTER — HOSPITAL ENCOUNTER (OUTPATIENT)
Facility: HOSPITAL | Age: 49
Setting detail: OBSERVATION
Discharge: HOME | End: 2024-11-04
Attending: EMERGENCY MEDICINE | Admitting: STUDENT IN AN ORGANIZED HEALTH CARE EDUCATION/TRAINING PROGRAM
Payer: COMMERCIAL

## 2024-11-03 ENCOUNTER — APPOINTMENT (OUTPATIENT)
Dept: GENERAL RADIOLOGY | Facility: HOSPITAL | Age: 49
End: 2024-11-03
Attending: EMERGENCY MEDICINE
Payer: COMMERCIAL

## 2024-11-03 DIAGNOSIS — N28.9 ACUTE RENAL INSUFFICIENCY: ICD-10-CM

## 2024-11-03 DIAGNOSIS — R07.9 CHEST PAIN, RULE OUT ACUTE MYOCARDIAL INFARCTION: Primary | ICD-10-CM

## 2024-11-03 LAB
BASOPHILS # BLD AUTO: 0.09 X10(3) UL (ref 0–0.2)
BASOPHILS NFR BLD AUTO: 0.9 %
EOSINOPHIL # BLD AUTO: 0.22 X10(3) UL (ref 0–0.7)
EOSINOPHIL NFR BLD AUTO: 2.2 %
ERYTHROCYTE [DISTWIDTH] IN BLOOD BY AUTOMATED COUNT: 13.2 %
GLUCOSE BLD-MCNC: 60 MG/DL (ref 70–99)
GLUCOSE BLD-MCNC: 76 MG/DL (ref 70–99)
HCT VFR BLD AUTO: 39.8 %
HGB BLD-MCNC: 13.4 G/DL
IMM GRANULOCYTES # BLD AUTO: 0.03 X10(3) UL (ref 0–1)
IMM GRANULOCYTES NFR BLD: 0.3 %
LYMPHOCYTES # BLD AUTO: 2.52 X10(3) UL (ref 1–4)
LYMPHOCYTES NFR BLD AUTO: 25.1 %
MCH RBC QN AUTO: 27.7 PG (ref 26–34)
MCHC RBC AUTO-ENTMCNC: 33.7 G/DL (ref 31–37)
MCV RBC AUTO: 82.2 FL
MONOCYTES # BLD AUTO: 0.63 X10(3) UL (ref 0.1–1)
MONOCYTES NFR BLD AUTO: 6.3 %
NEUTROPHILS # BLD AUTO: 6.55 X10 (3) UL (ref 1.5–7.7)
NEUTROPHILS # BLD AUTO: 6.55 X10(3) UL (ref 1.5–7.7)
NEUTROPHILS NFR BLD AUTO: 65.2 %
PLATELET # BLD AUTO: 276 10(3)UL (ref 150–450)
RBC # BLD AUTO: 4.84 X10(6)UL
WBC # BLD AUTO: 10 X10(3) UL (ref 4–11)

## 2024-11-03 PROCEDURE — 71045 X-RAY EXAM CHEST 1 VIEW: CPT | Performed by: EMERGENCY MEDICINE

## 2024-11-03 RX ORDER — ONDANSETRON 2 MG/ML
INJECTION INTRAMUSCULAR; INTRAVENOUS
Status: COMPLETED
Start: 2024-11-03 | End: 2024-11-03

## 2024-11-03 RX ORDER — ONDANSETRON 2 MG/ML
4 INJECTION INTRAMUSCULAR; INTRAVENOUS ONCE
Status: COMPLETED | OUTPATIENT
Start: 2024-11-03 | End: 2024-11-03

## 2024-11-04 ENCOUNTER — APPOINTMENT (OUTPATIENT)
Dept: CT IMAGING | Facility: HOSPITAL | Age: 49
End: 2024-11-04
Attending: STUDENT IN AN ORGANIZED HEALTH CARE EDUCATION/TRAINING PROGRAM
Payer: COMMERCIAL

## 2024-11-04 VITALS
HEART RATE: 88 BPM | OXYGEN SATURATION: 96 % | DIASTOLIC BLOOD PRESSURE: 90 MMHG | TEMPERATURE: 98 F | WEIGHT: 260.69 LBS | RESPIRATION RATE: 22 BRPM | SYSTOLIC BLOOD PRESSURE: 139 MMHG | BODY MASS INDEX: 32 KG/M2

## 2024-11-04 PROBLEM — R07.9 CHEST PAIN, RULE OUT ACUTE MYOCARDIAL INFARCTION: Status: ACTIVE | Noted: 2024-11-04

## 2024-11-04 LAB
ALBUMIN SERPL-MCNC: 4.4 G/DL (ref 3.2–4.8)
ALBUMIN/GLOB SERPL: 1.6 {RATIO} (ref 1–2)
ALP LIVER SERPL-CCNC: 47 U/L
ALT SERPL-CCNC: 27 U/L
ANION GAP SERPL CALC-SCNC: 4 MMOL/L (ref 0–18)
ANION GAP SERPL CALC-SCNC: 5 MMOL/L (ref 0–18)
ANION GAP SERPL CALC-SCNC: 9 MMOL/L (ref 0–18)
AST SERPL-CCNC: 23 U/L (ref ?–34)
BILIRUB SERPL-MCNC: 0.2 MG/DL (ref 0.3–1.2)
BILIRUB UR QL STRIP.AUTO: NEGATIVE
BUN BLD-MCNC: 14 MG/DL (ref 9–23)
BUN BLD-MCNC: 17 MG/DL (ref 9–23)
BUN BLD-MCNC: 17 MG/DL (ref 9–23)
CALCIUM BLD-MCNC: 8.9 MG/DL (ref 8.7–10.4)
CALCIUM BLD-MCNC: 9.4 MG/DL (ref 8.7–10.4)
CALCIUM BLD-MCNC: 9.7 MG/DL (ref 8.7–10.4)
CHLORIDE SERPL-SCNC: 107 MMOL/L (ref 98–112)
CLARITY UR REFRACT.AUTO: CLEAR
CO2 SERPL-SCNC: 23 MMOL/L (ref 21–32)
CO2 SERPL-SCNC: 26 MMOL/L (ref 21–32)
CO2 SERPL-SCNC: 28 MMOL/L (ref 21–32)
CREAT BLD-MCNC: 1.3 MG/DL
CREAT BLD-MCNC: 1.34 MG/DL
CREAT BLD-MCNC: 1.9 MG/DL
CREAT UR-SCNC: 113.7 MG/DL
D DIMER PPP FEU-MCNC: <0.27 UG/ML FEU (ref ?–0.5)
EGFRCR SERPLBLD CKD-EPI 2021: 43 ML/MIN/1.73M2 (ref 60–?)
EGFRCR SERPLBLD CKD-EPI 2021: 66 ML/MIN/1.73M2 (ref 60–?)
EGFRCR SERPLBLD CKD-EPI 2021: 68 ML/MIN/1.73M2 (ref 60–?)
GLOBULIN PLAS-MCNC: 2.8 G/DL (ref 2–3.5)
GLUCOSE BLD-MCNC: 109 MG/DL (ref 70–99)
GLUCOSE BLD-MCNC: 60 MG/DL (ref 70–99)
GLUCOSE BLD-MCNC: 73 MG/DL (ref 70–99)
GLUCOSE BLD-MCNC: 81 MG/DL (ref 70–99)
GLUCOSE BLD-MCNC: 84 MG/DL (ref 70–99)
GLUCOSE BLD-MCNC: 87 MG/DL (ref 70–99)
GLUCOSE BLD-MCNC: 96 MG/DL (ref 70–99)
GLUCOSE UR STRIP.AUTO-MCNC: NORMAL MG/DL
KETONES UR STRIP.AUTO-MCNC: NEGATIVE MG/DL
LEUKOCYTE ESTERASE UR QL STRIP.AUTO: NEGATIVE
LIPASE SERPL-CCNC: 71 U/L (ref 12–53)
NITRITE UR QL STRIP.AUTO: NEGATIVE
OSMOLALITY SERPL CALC.SUM OF ELEC: 286 MOSM/KG (ref 275–295)
OSMOLALITY SERPL CALC.SUM OF ELEC: 287 MOSM/KG (ref 275–295)
OSMOLALITY SERPL CALC.SUM OF ELEC: 290 MOSM/KG (ref 275–295)
PH UR STRIP.AUTO: 5.5 [PH] (ref 5–8)
POTASSIUM SERPL-SCNC: 3.8 MMOL/L (ref 3.5–5.1)
POTASSIUM SERPL-SCNC: 3.8 MMOL/L (ref 3.5–5.1)
POTASSIUM SERPL-SCNC: 4.2 MMOL/L (ref 3.5–5.1)
PROT SERPL-MCNC: 7.2 G/DL (ref 5.7–8.2)
RBC UR QL AUTO: NEGATIVE
SODIUM SERPL-SCNC: 102 MMOL/L
SODIUM SERPL-SCNC: 137 MMOL/L (ref 136–145)
SODIUM SERPL-SCNC: 139 MMOL/L (ref 136–145)
SODIUM SERPL-SCNC: 140 MMOL/L (ref 136–145)
SP GR UR STRIP.AUTO: 1.01 (ref 1–1.03)
TROPONIN I SERPL HS-MCNC: 9 NG/L
UROBILINOGEN UR STRIP.AUTO-MCNC: NORMAL MG/DL
UUN UR-MCNC: 502 MG/DL

## 2024-11-04 PROCEDURE — 70450 CT HEAD/BRAIN W/O DYE: CPT | Performed by: STUDENT IN AN ORGANIZED HEALTH CARE EDUCATION/TRAINING PROGRAM

## 2024-11-04 PROCEDURE — 99223 1ST HOSP IP/OBS HIGH 75: CPT | Performed by: STUDENT IN AN ORGANIZED HEALTH CARE EDUCATION/TRAINING PROGRAM

## 2024-11-04 RX ORDER — GABAPENTIN 300 MG/1
600 CAPSULE ORAL 3 TIMES DAILY
Status: DISCONTINUED | OUTPATIENT
Start: 2024-11-04 | End: 2024-11-04

## 2024-11-04 RX ORDER — ECHINACEA PURPUREA EXTRACT 125 MG
1 TABLET ORAL
Status: DISCONTINUED | OUTPATIENT
Start: 2024-11-04 | End: 2024-11-04

## 2024-11-04 RX ORDER — NICOTINE POLACRILEX 4 MG
15 LOZENGE BUCCAL
Status: DISCONTINUED | OUTPATIENT
Start: 2024-11-04 | End: 2024-11-04

## 2024-11-04 RX ORDER — SODIUM CHLORIDE 9 MG/ML
125 INJECTION, SOLUTION INTRAVENOUS CONTINUOUS
Status: DISCONTINUED | OUTPATIENT
Start: 2024-11-04 | End: 2024-11-04

## 2024-11-04 RX ORDER — SODIUM CHLORIDE 9 MG/ML
INJECTION, SOLUTION INTRAVENOUS CONTINUOUS
Status: ACTIVE | OUTPATIENT
Start: 2024-11-04 | End: 2024-11-04

## 2024-11-04 RX ORDER — NICOTINE POLACRILEX 4 MG
30 LOZENGE BUCCAL
Status: DISCONTINUED | OUTPATIENT
Start: 2024-11-04 | End: 2024-11-04

## 2024-11-04 RX ORDER — DEXTROSE MONOHYDRATE 25 G/50ML
50 INJECTION, SOLUTION INTRAVENOUS
Status: DISCONTINUED | OUTPATIENT
Start: 2024-11-04 | End: 2024-11-04

## 2024-11-04 RX ORDER — ONDANSETRON 2 MG/ML
4 INJECTION INTRAMUSCULAR; INTRAVENOUS EVERY 6 HOURS PRN
Status: DISCONTINUED | OUTPATIENT
Start: 2024-11-04 | End: 2024-11-04

## 2024-11-04 RX ORDER — CARVEDILOL 12.5 MG/1
25 TABLET ORAL 2 TIMES DAILY WITH MEALS
Status: DISCONTINUED | OUTPATIENT
Start: 2024-11-04 | End: 2024-11-04

## 2024-11-04 RX ORDER — ACETAMINOPHEN 500 MG
500 TABLET ORAL EVERY 4 HOURS PRN
Status: DISCONTINUED | OUTPATIENT
Start: 2024-11-04 | End: 2024-11-04

## 2024-11-04 RX ORDER — HYDRALAZINE HYDROCHLORIDE 50 MG/1
50 TABLET, FILM COATED ORAL EVERY 6 HOURS SCHEDULED
Status: DISCONTINUED | OUTPATIENT
Start: 2024-11-04 | End: 2024-11-04

## 2024-11-04 RX ORDER — AMLODIPINE BESYLATE 5 MG/1
5 TABLET ORAL DAILY
Status: DISCONTINUED | OUTPATIENT
Start: 2024-11-04 | End: 2024-11-04

## 2024-11-04 RX ORDER — PROCHLORPERAZINE EDISYLATE 5 MG/ML
5 INJECTION INTRAMUSCULAR; INTRAVENOUS EVERY 8 HOURS PRN
Status: DISCONTINUED | OUTPATIENT
Start: 2024-11-04 | End: 2024-11-04

## 2024-11-04 RX ORDER — ENOXAPARIN SODIUM 100 MG/ML
40 INJECTION SUBCUTANEOUS DAILY
Status: DISCONTINUED | OUTPATIENT
Start: 2024-11-04 | End: 2024-11-04

## 2024-11-04 RX ORDER — PRAVASTATIN SODIUM 20 MG
40 TABLET ORAL NIGHTLY
Status: DISCONTINUED | OUTPATIENT
Start: 2024-11-04 | End: 2024-11-04

## 2024-11-04 NOTE — ED PROVIDER NOTES
Patient Seen in: [facility] Emergency Department      History     Chief Complaint   Patient presents with    Nausea/Vomiting/Diarrhea     Stated Complaint: dizzy, nausea, diarrhea, 2 hours PTA. pt wearing heart monitor but it has not a*    Subjective:   HPI      49-year-old female presenting with chest pain.  Patient had a similar episode couple weeks ago which department to the hospital.  At that time he had a normal cardiac catheter.  Patient states that this evening he was eating when also he started having chest pain all across his chest associated with blurry vision week and a dizziness which he describes as feeling like he was going to pass out.  He became extremely nauseous.  He now presents to the emergency department.  His symptoms started to improve.  He denies any sort of recent trauma or any other exacerbating relieving factor.    Objective:     Past Medical History:    Congestive heart disease (HCC)    Diabetes (HCC)    Essential hypertension    High blood pressure              History reviewed. No pertinent surgical history.             Social History     Socioeconomic History    Marital status: Single   Tobacco Use    Smoking status: Never    Smokeless tobacco: Never   Vaping Use    Vaping status: Never Used   Substance and Sexual Activity    Alcohol use: Not Currently    Drug use: Not Currently     Social Drivers of Health     Food Insecurity: No Food Insecurity (10/8/2024)    Food Insecurity     Food Insecurity: Never true   Transportation Needs: No Transportation Needs (10/8/2024)    Transportation Needs     Lack of Transportation: No   Housing Stability: Low Risk  (10/8/2024)    Housing Stability     Housing Instability: No                  Physical Exam     ED Triage Vitals [11/03/24 6875]   /80   Pulse 104   Resp 18   Temp 97.2 °F (36.2 °C)   Temp src Temporal   SpO2 96 %   O2 Device None (Room air)       Current Vitals:   Vital Signs  BP: 129/89  Pulse: 87  Resp: 13  Temp: 97.2 °F  (36.2 °C)  Temp src: Temporal  MAP (mmHg): (!) 101    Oxygen Therapy  SpO2: 99 %  O2 Device: None (Room air)        Physical Exam  Awake alert patient appears in no distress HEENT exam is normal lungs are clear cardiovascular exam shows a regular rhythm abdomen soft and nontender extremities no COVID cyanosis or edema no rash no focal neurologic deficits    ED Course     Labs Reviewed   COMP METABOLIC PANEL (14) - Abnormal; Notable for the following components:       Result Value    Glucose 60 (*)     Creatinine 1.90 (*)     eGFR-Cr 43 (*)     Bilirubin, Total 0.2 (*)     All other components within normal limits   LIPASE - Abnormal; Notable for the following components:    Lipase 71 (*)     All other components within normal limits   POCT GLUCOSE - Abnormal; Notable for the following components:    POC Glucose 60 (*)     All other components within normal limits   TROPONIN I HIGH SENSITIVITY - Normal   POCT GLUCOSE - Normal   CBC WITH DIFFERENTIAL WITH PLATELET   RAINBOW DRAW GOLD   RAINBOW DRAW BLUE     EKG    Rate, intervals and axes as noted on EKG Report.  Rate: 99  Rhythm: Sinus Rhythm  Reading: No areas of acute ST segment elevation or depression                Differential diagnosis includes acute coronary syndrome, acute renal failure       MDM          Admission disposition: 11/4/2024 12:43 AM           Medical Decision Making  49-year-old male presenting with chest pain.  IV established cardiac monitor shows a sinus rhythm pulse ox shows no signs of hypoxia Accu-Chek shows the patient's blood sugar to be 60 so patient was given oral glucose here in the emergency department.  CBC shows stable hemoglobin level metabolic panel worsening renal function which was addressed with IV fluids troponin shows elevation negative NSTEMI and independent interpretation by ED physician of chest x-ray shows no focal consolidation patient will be admitted for further evaluation at this time gentle hydration and rechecking  his kidney function to make sure is continue to improve    Problems Addressed:  Acute renal insufficiency: acute illness or injury  Chest pain, rule out acute myocardial infarction: acute illness or injury    Amount and/or Complexity of Data Reviewed  Labs: ordered. Decision-making details documented in ED Course.  Radiology: ordered and independent interpretation performed. Decision-making details documented in ED Course.  ECG/medicine tests: ordered and independent interpretation performed. Decision-making details documented in ED Course.        Disposition and Plan     Clinical Impression:  1. Chest pain, rule out acute myocardial infarction    2. Acute renal insufficiency         Disposition:  Admit  11/4/2024 12:43 am    Follow-up:  No follow-up provider specified.        Medications Prescribed:  Current Discharge Medication List              Supplementary Documentation:         Hospital Problems       Present on Admission  Date Reviewed: 11/3/2024            ICD-10-CM Noted POA    * (Principal) Chest pain, rule out acute myocardial infarction R07.9 11/4/2024 Unknown

## 2024-11-04 NOTE — H&P
[facility]     Karel Sy Patient Status:  Observation    1975 MRN [id number]   Location [facility] 8NE-A Attending Esha Herrera,    Hosp Day # 0 PCP CARLITO HOWELL     Chief Complaint: dizziness, nausea    Subjective:    History of Present Illness:     Karel Sy is a 49 year old male with PMHX DM/ HTN/ HLD/ C Diff who presented to the hospital for dizziness. He reports taking his 10 units of insulin but only eating a small dinner. After eating he began to feel dizzy like he could pass out and he had blurry vision. He was nauseous and had a sharp discomfort in his chest. It got to the point he was having problems ambulating and he called 911. He is now feeling a bit better but continues to feel slightly dizzy. He also notes 1 episode non-bloody diarrhea without abdominal pain or fever.     History/Other:    Past Medical History:  Past Medical History:    Congestive heart disease (HCC)    Diabetes (HCC)    Essential hypertension    High blood pressure     Past Surgical History:   History reviewed. No pertinent surgical history.   Family History:   History reviewed. No pertinent family history.  Social History:    reports that he has never smoked. He has never used smokeless tobacco. He reports that he does not currently use alcohol. He reports that he does not currently use drugs.     Allergies: Allergies[1]    Medications:  Medications Ordered Prior to Encounter[2]    Review of Systems:   A comprehensive review of systems was completed.    Pertinent positives and negatives noted in the HPI.    Objective:   Physical Exam:    BP (!) 150/95 (BP Location: Left arm)   Pulse 117   Temp 97.9 °F (36.6 °C) (Oral)   Resp 19   Wt 249 lb 1.9 oz (113 kg)   SpO2 98%   BMI 30.32 kg/m²   General: No acute distress, Alert  Respiratory: No rhonchi, no wheezes, on room air  Cardiovascular: S1, S2. Regular rate and rhythm  Abdomen: Soft, Non-tender, non-distended,  positive bowel sounds  Neuro: No new focal deficits  Extremities: No edema    Results:    Labs:      Labs Last 24 Hours:    Recent Labs   Lab 11/03/24  2319   RBC 4.84   HGB 13.4   HCT 39.8   MCV 82.2   MCH 27.7   MCHC 33.7   RDW 13.2   NEPRELIM 6.55   WBC 10.0   .0       Recent Labs   Lab 11/03/24  2319   GLU 60*   BUN 17   CREATSERUM 1.90*   EGFRCR 43*   CA 9.7   ALB 4.4      K 3.8      CO2 23.0   ALKPHO 47   AST 23   ALT 27   BILT 0.2*   TP 7.2       No results found for: \"PT\", \"INR\"    Recent Labs   Lab 11/03/24  2319   TROPHS 9       No results for input(s): \"TROP\", \"PBNP\" in the last 168 hours.    No results for input(s): \"PCT\" in the last 168 hours.    Imaging: Imaging data reviewed in Epic.    Assessment & Plan:      #hypoglycemia  -glucose 60 on BMP  -s/p dextrose  -suspect hypoglycemia leading cause of most of his symptoms, did recently get started on mealtime insulin and ate less than usual for dinner prior to symptoms  -hypoglycemia protocol  -SSI for now  -hold home metformin, glipizide, NPH  -obtain CT head for dizziness as symptoms not completely resolved despite normal glucose    #LUIS CARLOS  -Cr 1.90 with Bun 17: baseline 1.13 with GFR 81  -BUN: Cr ratio <20 suggestive of intra-renal vs post-renal ds  -obtain PVR  -send urine studies  -strict I/O, avoid nephrotoxins  -cont to monitor BMP    #chest pain  -recent negative angiogram on 10/10/24  -d-dimer negative  -suspect non-cardiac source    #HTN  -cont home amlodipine, hydralazine, carvedilol  -valsartan on hold 2/2 LUIS CARLOS    #HLD  -cont home statin    #gout  -hold home allopurinol    #neuropathy  -cont home gabapentin        Plan of care discussed with ED physician    Esha Herrera DO    Supplementary Documentation:     The 21st Century Cures Act makes medical notes like these available to patients in the interest of transparency. Please be advised this is a medical document. Medical documents are intended to carry relevant information,  facts as evident, and the clinical opinion of the practitioner. The medical note is intended as peer to peer communication and may appear blunt or direct. It is written in medical language and may contain abbreviations or verbiage that are unfamiliar.                                       [1]   Allergies  Allergen Reactions    Shrimp SHORTNESS OF BREATH    Codeine NAUSEA AND VOMITING   [2]   Current Facility-Administered Medications on File Prior to Encounter   Medication Dose Route Frequency Provider Last Rate Last Admin    [COMPLETED] aspirin chewable tab 324 mg  324 mg Oral Once Laura Quezada MD   324 mg at 10/10/24 0739    [COMPLETED] metoprolol (Lopressor) 5 mg/5mL injection 5 mg  5 mg Intravenous Once Luna Vanegas APRN   5 mg at 10/10/24 0547    [COMPLETED] lidocaine PF (Xylocaine-MPF) 1 % injection             [COMPLETED] verapamil (Isoptin) 2.5 mg/mL injection             [COMPLETED] heparin (Porcine) 5000 UNIT/ML injection             [COMPLETED] Nitroglycerin in D5W 200-5 MCG/ML-% injection             [COMPLETED] iohexol (OMNIPAQUE) 350 MG/ML injection 100 mL  100 mL Injection ONCE PRN Laura Quezada MD   50 mL at 10/10/24 0850    [COMPLETED] fentaNYL (Sublimaze) 50 mcg/mL injection             [COMPLETED] midazolam (Versed) 2 MG/2ML injection             [COMPLETED] midazolam (Versed) 2 MG/2ML injection             [COMPLETED] heparin (Porcine) 5000 UNIT/ML injection             [COMPLETED] amLODIPine (Norvasc) tab 5 mg  5 mg Oral Once Laura Quezada MD   5 mg at 10/10/24 1045    [COMPLETED] hydrALAzine (Apresoline) 20 mg/mL injection 10 mg  10 mg Intravenous Once Alia Mcneill APRN   10 mg at 10/10/24 1506    [COMPLETED] Perflutren Lipid Microsphere (DEFINITY) 6.52 MG/ML injection 1.5 mL  1.5 mL Intravenous ONCE PRN Shelley Anaya MD   1.5 mL at 10/09/24 0936    [COMPLETED] metoprolol (Lopressor) 5 mg/5mL injection 5 mg  5 mg Intravenous Once Luan Vanegas APRN   5 mg  at 10/09/24 2013    [COMPLETED] labetalol (Trandate) 5 mg/mL injection 20 mg  20 mg Intravenous Once Tommie Schneider MD   20 mg at 10/08/24 1939    [COMPLETED] aspirin chewable tab 324 mg  324 mg Oral Once Tommie Schneider MD   324 mg at 10/08/24 2046    [COMPLETED] potassium chloride (Klor-Con M20) tab 40 mEq  40 mEq Oral Q4H Soumya Lozoya MD   40 mEq at 10/09/24 0245     Current Outpatient Medications on File Prior to Encounter   Medication Sig Dispense Refill    Blood Glucose Monitoring Suppl (ONETOUCH VERIO FLEX SYSTEM) w/Device Does not apply Kit Test blood sugar three times per day. 1 kit 0    Glucose Blood (ONETOUCH VERIO) In Vitro Strip Test blood sugar three  times per day. 100 strip 6    OneTouch Delica Lancets 33G Does not apply Misc Test blood sugar three times per day. 100 each 6    amLODIPine 5 MG Oral Tab Take 1 tablet (5 mg total) by mouth daily. 30 tablet 0    hydrALAZINE 50 MG Oral Tab Take 1 tablet (50 mg total) by mouth every 6 (six) hours. 120 tablet 0    Blood Glucose Monitoring Suppl (ONETOUCH VERIO FLEX SYSTEM) w/Device Does not apply Kit Test blood sugar 4 times per day. 1 kit 0    Glucose Blood (ONETOUCH VERIO) In Vitro Strip Test blood sugar 4 times per day. 100 strip 6    OneTouch Delica Lancets 33G Does not apply Misc Test blood sugar 4 times per day. 100 each 6    pravastatin 20 MG Oral Tab Take 2 tablets (40 mg total) by mouth nightly.      valsartan 160 MG Oral Tab Take 1 tablet (160 mg total) by mouth daily.      insulin aspart 100 UNIT/ML Subcutaneous Solution Cartridge Inject into the skin once.      allopurinol 100 MG Oral Tab Take 1 tablet (100 mg total) by mouth daily. 30 tablet 0    metFORMIN 850 MG Oral Tab Take 1 tablet (850 mg total) by mouth 2 (two) times daily with meals.      gabapentin 300 MG Oral Cap Take 1 capsule (300 mg total) by mouth in the morning and 1 capsule (300 mg total) before bedtime.      allopurinol 300 MG Oral Tab Take 1 tablet (300 mg total) by mouth daily.       carvedilol 25 MG Oral Tab Take 1 tablet (25 mg total) by mouth 2 (two) times daily with meals.      glipiZIDE 10 MG Oral Tab Take 1 tablet (10 mg total) by mouth 2 (two) times daily before meals.      Insulin NPH & Regular 70/30 (70-30) 100 UNIT/ML Subcutaneous Suspension Inject into the skin 2 (two) times daily before meals.      Insulin Lispro 100 UNIT/ML Subcutaneous Solution Inject into the skin 3 (three) times daily before meals.

## 2024-11-04 NOTE — PROGRESS NOTES
11/04/24 0153 11/04/24 0157 11/04/24 0211   Vital Signs   Temp 97.9 °F (36.6 °C)  --   --    Temp src Oral  --   --    Pulse 117  --  83   Heart Rate Source Monitor  --   --    Resp 19  --   --    Respiratory Quality Normal  --   --    /90 (!) 150/95 (!) 134/99   MAP (mmHg) (!) 103 (!) 110 (!) 110   BP Location Left arm Left arm  --    BP Method Automatic Automatic  --    Patient Position Lying Sitting  --    Oxygen Therapy   SpO2 97 % 98 % 99 %   O2 Device None (Room air)  --   --    O2 Flow Rate (L/min) 0 L/min  --   --    Pulse Oximetry Type Continuous  --   --    Oximetry Probe Site Changed Yes  --   --    Pulse Ox Probe Location Left hand  --   --      Q shift ortho

## 2024-11-04 NOTE — PLAN OF CARE
Assumed care of pt around 0730  AxO x4, R/A, up independently  NSR on tele, no cardiac symptoms -pt wearing outpatient heart monitor  Pt denies any pain -does endorse some stomach cramping  IVF  Continent of bowel and bladder  Fall precautions in place, pt updated on plan of care          Problem: CARDIOVASCULAR - ADULT  Goal: Maintains optimal cardiac output and hemodynamic stability  Description: INTERVENTIONS:  - Monitor vital signs, rhythm, and trends  - Monitor for bleeding, hypotension and signs of decreased cardiac output  - Evaluate effectiveness of vasoactive medications to optimize hemodynamic stability  - Monitor arterial and/or venous puncture sites for bleeding and/or hematoma  - Assess quality of pulses, skin color and temperature  - Assess for signs of decreased coronary artery perfusion - ex. Angina  - Evaluate fluid balance, assess for edema, trend weights  Outcome: Progressing  Goal: Absence of cardiac arrhythmias or at baseline  Description: INTERVENTIONS:  - Continuous cardiac monitoring, monitor vital signs, obtain 12 lead EKG if indicated  - Evaluate effectiveness of antiarrhythmic and heart rate control medications as ordered  - Initiate emergency measures for life threatening arrhythmias  - Monitor electrolytes and administer replacement therapy as ordered  Outcome: Progressing     Problem: RESPIRATORY - ADULT  Goal: Achieves optimal ventilation and oxygenation  Description: INTERVENTIONS:  - Assess for changes in respiratory status  - Assess for changes in mentation and behavior  - Position to facilitate oxygenation and minimize respiratory effort  - Oxygen supplementation based on oxygen saturation or ABGs  - Provide Smoking Cessation handout, if applicable  - Encourage broncho-pulmonary hygiene including cough, deep breathe, Incentive Spirometry  - Assess the need for suctioning and perform as needed  - Assess and instruct to report SOB or any respiratory difficulty  - Respiratory Therapy  support as indicated  - Manage/alleviate anxiety  - Monitor for signs/symptoms of CO2 retention  Outcome: Progressing     Problem: GASTROINTESTINAL - ADULT  Goal: Minimal or absence of nausea and vomiting  Description: INTERVENTIONS:  - Maintain adequate hydration with IV or PO as ordered and tolerated  - Nasogastric tube to low intermittent suction as ordered  - Evaluate effectiveness of ordered antiemetic medications  - Provide nonpharmacologic comfort measures as appropriate  - Advance diet as tolerated, if ordered  - Obtain nutritional consult as needed  - Evaluate fluid balance  Outcome: Progressing  Goal: Maintains or returns to baseline bowel function  Description: INTERVENTIONS:  - Assess bowel function  - Maintain adequate hydration with IV or PO as ordered and tolerated  - Evaluate effectiveness of GI medications  - Encourage mobilization and activity  - Obtain nutritional consult as needed  - Establish a toileting routine/schedule  - Consider collaborating with pharmacy to review patient's medication profile  Outcome: Progressing  Goal: Maintains adequate nutritional intake (undernourished)  Description: INTERVENTIONS:  - Monitor percentage of each meal consumed  - Identify factors contributing to decreased intake, treat as appropriate  - Assist with meals as needed  - Monitor I&O, WT and lab values  - Obtain nutritional consult as needed  - Optimize oral hygiene and moisture  - Encourage food from home; allow for food preferences  - Enhance eating environment  Outcome: Progressing  Goal: Achieves appropriate nutritional intake (bariatric)  Description: INTERVENTIONS:  - Monitor for over-consumption  - Identify factors contributing to increased intake, treat as appropriate  - Monitor I&O, WT and lab values  - Obtain nutritional consult as needed  - Evaluate psychosocial factors contributing to over-consumption  Outcome: Progressing

## 2024-11-04 NOTE — PLAN OF CARE
NURSING ADMISSION NOTE      Patient admitted via Cart  Alert& Oriented 4, c/o dizziness headache, MD notified. Up standby, room air, ns on tele, continent bowel and bladder. Plan of care discussed with pt.  Safety precautions initiated.  Bed in low position.  Call light in reach.  Skin check done with bharti long    Pt is wearing an external cardiac monitor on his chest.    Patient admitted from ED. Admission data base completed, assessment completed , admission orders received and initiated.   Problem: CARDIOVASCULAR - ADULT  Goal: Maintains optimal cardiac output and hemodynamic stability  Description: INTERVENTIONS:  - Monitor vital signs, rhythm, and trends  - Monitor for bleeding, hypotension and signs of decreased cardiac output  - Evaluate effectiveness of vasoactive medications to optimize hemodynamic stability  - Monitor arterial and/or venous puncture sites for bleeding and/or hematoma  - Assess quality of pulses, skin color and temperature  - Assess for signs of decreased coronary artery perfusion - ex. Angina  - Evaluate fluid balance, assess for edema, trend weights  Outcome: Progressing  Goal: Absence of cardiac arrhythmias or at baseline  Description: INTERVENTIONS:  - Continuous cardiac monitoring, monitor vital signs, obtain 12 lead EKG if indicated  - Evaluate effectiveness of antiarrhythmic and heart rate control medications as ordered  - Initiate emergency measures for life threatening arrhythmias  - Monitor electrolytes and administer replacement therapy as ordered  Outcome: Progressing     Problem: RESPIRATORY - ADULT  Goal: Achieves optimal ventilation and oxygenation  Description: INTERVENTIONS:  - Assess for changes in respiratory status  - Assess for changes in mentation and behavior  - Position to facilitate oxygenation and minimize respiratory effort  - Oxygen supplementation based on oxygen saturation or ABGs  - Provide Smoking Cessation handout, if applicable  - Encourage  broncho-pulmonary hygiene including cough, deep breathe, Incentive Spirometry  - Assess the need for suctioning and perform as needed  - Assess and instruct to report SOB or any respiratory difficulty  - Respiratory Therapy support as indicated  - Manage/alleviate anxiety  - Monitor for signs/symptoms of CO2 retention  Outcome: Progressing     Problem: GASTROINTESTINAL - ADULT  Goal: Minimal or absence of nausea and vomiting  Description: INTERVENTIONS:  - Maintain adequate hydration with IV or PO as ordered and tolerated  - Nasogastric tube to low intermittent suction as ordered  - Evaluate effectiveness of ordered antiemetic medications  - Provide nonpharmacologic comfort measures as appropriate  - Advance diet as tolerated, if ordered  - Obtain nutritional consult as needed  - Evaluate fluid balance  Outcome: Progressing  Goal: Maintains or returns to baseline bowel function  Description: INTERVENTIONS:  - Assess bowel function  - Maintain adequate hydration with IV or PO as ordered and tolerated  - Evaluate effectiveness of GI medications  - Encourage mobilization and activity  - Obtain nutritional consult as needed  - Establish a toileting routine/schedule  - Consider collaborating with pharmacy to review patient's medication profile  Outcome: Progressing  Goal: Maintains adequate nutritional intake (undernourished)  Description: INTERVENTIONS:  - Monitor percentage of each meal consumed  - Identify factors contributing to decreased intake, treat as appropriate  - Assist with meals as needed  - Monitor I&O, WT and lab values  - Obtain nutritional consult as needed  - Optimize oral hygiene and moisture  - Encourage food from home; allow for food preferences  - Enhance eating environment  Outcome: Progressing  Goal: Achieves appropriate nutritional intake (bariatric)  Description: INTERVENTIONS:  - Monitor for over-consumption  - Identify factors contributing to increased intake, treat as appropriate  - Monitor  I&O, WT and lab values  - Obtain nutritional consult as needed  - Evaluate psychosocial factors contributing to over-consumption  Outcome: Progressing

## 2024-11-04 NOTE — ED QUICK NOTES
Orders for admission, patient is aware of plan and ready to go upstairs. Any questions, please call ED RN Candice at extension [phone].     Patient Covid vaccination status: Partially vaccinated     COVID Test Ordered in ED: None    COVID Suspicion at Admission: N/A    Running Infusions:    sodium chloride 125 mL/hr (11/04/24 0032)        Mental Status/LOC at time of transport: AOx4    Other pertinent information:  CIWA score: N/A   NIH score:  N/A

## 2024-11-04 NOTE — PROGRESS NOTES
Patient seen and examined   Chest: CTA B/L  CVS: S1, S2, RRR  ABD: Soft, NT, ND, BS+  EXT: No c/c    Mild abd cramping with loose stool- improving  Labs in afternoon  ADAT  F/u CT brain   Insulin adjusted     Jorje Wright MD

## 2024-11-05 ENCOUNTER — PATIENT OUTREACH (OUTPATIENT)
Dept: CASE MANAGEMENT | Age: 49
End: 2024-11-05

## 2024-11-05 LAB
ATRIAL RATE: 99 BPM
P-R INTERVAL: 164 MS
Q-T INTERVAL: 348 MS
QRS DURATION: 84 MS
QTC CALCULATION (BEZET): 446 MS
R AXIS: 221 DEGREES
T AXIS: 216 DEGREES
VENTRICULAR RATE: 99 BPM

## 2024-11-05 NOTE — PROGRESS NOTES
NURSING DISCHARGE NOTE    Discharged Home via Wheelchair.  Accompanied by RN  Belongings Taken by patient/family.    Pt cleared by all services to discharge. Discharge instructions given to and understood by patient. Appointments reviewed. Pt left unit with all belongings taken to [facility] by RN for discharge.

## 2024-11-05 NOTE — PROGRESS NOTES
Called pt to schedule a DM hospital follow-up appt :    DM Request :    Last A1C Value: 11.5% Date: [10/9/2024]    Kelsey Purvis M.D.  [address]  [address]  [place] [address]  [phone] - number not active  [phone]        Attempt #1:  Left message on voicemail for patient to call transitions specialist back to schedule follow up appointments. Provided Transitions specialist scheduling phone number [phone].

## 2024-11-06 NOTE — PAYOR COMM NOTE
Discharge Notification    Patient Name: JAEL BRADFORD  Payor: [employer]   Subscriber #: [id number]  Authorization Number: [id number]  Admit Date/Time: 11/3/2024 11:08 PM  Discharge Date/Time: 11/4/2024 6:51 PM    Observation status

## 2024-11-06 NOTE — PROGRESS NOTES
Called pt to schedule a DM hospital follow-up appt :     DM Request :     Last A1C Value: 11.5% Date: [10/9/2024]    Kelsey Purvis M.D.  [address]  [address]  [place] [address]  [phone] - number not active  [phone]      Attempt #2:  Left message on voicemail for patient to call transitions specialist back to schedule follow up appointments. Provided Transitions specialist scheduling phone number [phone].

## 2024-11-06 NOTE — PROGRESS NOTES
Returning call to pt :      Called pt to schedule a DM hospital follow-up appt :     DM Request :     Last A1C Value: 11.5% Date: [10/9/2024]    Kelsey Purvis M.D.  [facility] professional [address]  [address]  [place] [address]  [phone] - number not active  [phone]    No answer ; LVM to call back for assistance with scheduling

## 2024-11-07 NOTE — PROGRESS NOTES
Called pt to schedule a DM hospital follow-up appt :     DM Request :     Last A1C Value: 11.5% Date: [10/9/2024]    Kelsey Purvis M.D.  [address]  [address]  [place] [address]  [phone] - number not active  [phone]  Unable to reach pt after 3rd attempt    Attempt #3:  Left message on voicemail for patient to call transitions specialist back to schedule follow up appointments. Provided Transitions specialist scheduling phone number [phone]. Closing encounter. Will re-open if patient returns call.

## 2024-12-24 ENCOUNTER — APPOINTMENT (OUTPATIENT)
Dept: MRI IMAGING | Age: 49
End: 2024-12-24

## 2025-01-17 ENCOUNTER — LAB SERVICES (OUTPATIENT)
Dept: LAB | Age: 50
End: 2025-01-17

## 2025-01-17 ENCOUNTER — HOSPITAL ENCOUNTER (OUTPATIENT)
Dept: MRI IMAGING | Age: 50
Discharge: HOME | End: 2025-01-17

## 2025-01-17 DIAGNOSIS — R07.89 CHEST PAIN, ATYPICAL: ICD-10-CM

## 2025-01-17 DIAGNOSIS — I42.0 CARDIOMYOPATHY, DILATED  (CMD): ICD-10-CM

## 2025-01-17 LAB — HCT VFR BLD CALC: 42.6 % (ref 39–51)

## 2025-01-17 PROCEDURE — 75561 CARDIAC MRI FOR MORPH W/DYE: CPT

## 2025-01-17 PROCEDURE — 85014 HEMATOCRIT: CPT | Performed by: INTERNAL MEDICINE

## 2025-01-17 PROCEDURE — A9585 GADOBUTROL INJECTION: HCPCS

## 2025-01-17 PROCEDURE — 36415 COLL VENOUS BLD VENIPUNCTURE: CPT | Performed by: INTERNAL MEDICINE

## 2025-01-17 PROCEDURE — 10002805 HB CONTRAST AGENT

## 2025-01-17 RX ORDER — GADOBUTROL 604.72 MG/ML
25 INJECTION INTRAVENOUS ONCE
Status: COMPLETED | OUTPATIENT
Start: 2025-01-17 | End: 2025-01-17

## 2025-01-17 RX ADMIN — GADOBUTROL 25 ML: 604.72 INJECTION INTRAVENOUS at 12:35

## 2025-02-25 ENCOUNTER — APPOINTMENT (OUTPATIENT)
Dept: MRI IMAGING | Age: 50
End: 2025-02-25

## 2025-04-01 ENCOUNTER — OFFICE VISIT (OUTPATIENT)
Facility: CLINIC | Age: 50
End: 2025-04-01
Payer: COMMERCIAL

## 2025-04-01 VITALS
BODY MASS INDEX: 32.63 KG/M2 | WEIGHT: 268 LBS | DIASTOLIC BLOOD PRESSURE: 92 MMHG | HEART RATE: 98 BPM | HEIGHT: 76 IN | OXYGEN SATURATION: 99 % | RESPIRATION RATE: 16 BRPM | SYSTOLIC BLOOD PRESSURE: 140 MMHG

## 2025-04-01 DIAGNOSIS — R06.09 DOE (DYSPNEA ON EXERTION): Primary | ICD-10-CM

## 2025-04-01 PROCEDURE — 99204 OFFICE O/P NEW MOD 45 MIN: CPT | Performed by: INTERNAL MEDICINE

## 2025-04-01 RX ORDER — FLUTICASONE FUROATE AND VILANTEROL 100; 25 UG/1; UG/1
1 POWDER RESPIRATORY (INHALATION) DAILY
Qty: 1 EACH | Refills: 11 | Status: SHIPPED | OUTPATIENT
Start: 2025-04-01

## 2025-04-01 RX ORDER — DICLOFENAC SODIUM 75 MG/1
75 TABLET, DELAYED RELEASE ORAL 2 TIMES DAILY
COMMUNITY

## 2025-04-01 RX ORDER — INSULIN DEGLUDEC 200 U/ML
INJECTION, SOLUTION SUBCUTANEOUS
COMMUNITY
Start: 2025-03-12

## 2025-04-01 RX ORDER — DILTIAZEM HYDROCHLORIDE 180 MG/1
180 CAPSULE, EXTENDED RELEASE ORAL DAILY
COMMUNITY
Start: 2025-03-11

## 2025-04-01 RX ORDER — FUROSEMIDE 20 MG/1
20 TABLET ORAL DAILY
COMMUNITY

## 2025-04-01 RX ORDER — SEMAGLUTIDE 1.34 MG/ML
1 INJECTION, SOLUTION SUBCUTANEOUS
COMMUNITY
Start: 2025-03-04

## 2025-04-01 RX ORDER — CYCLOBENZAPRINE HCL 10 MG
TABLET ORAL
COMMUNITY
Start: 2024-10-17

## 2025-04-01 RX ORDER — OMEPRAZOLE 20 MG/1
20 CAPSULE, DELAYED RELEASE ORAL DAILY
COMMUNITY
Start: 2025-03-05

## 2025-04-01 NOTE — PATIENT INSTRUCTIONS
Obtain blood work, CT scan and pulmonary function test -Call central scheduling at [phone] to schedule the tests

## 2025-04-01 NOTE — H&P
[facility] Interventional Pulmonary Consult Note    History of Present Illness:  Karel Sy is a 49 year old male never smoker with significant PMH of NICM, DM, HTN, HLD who presents today for evaluation of dyspnea, cough and edema. He was diagnosed with heart failure last year and has followed with [facility]. On recent evaluation there was concern for cardiac sarcoidosis leading to his evaluation here. He denies new concerns. Does report ongoing AVALOS which has been stable. He has had dry cough and wheeze at times. Denies any hx of asthma, bronchitis or COPD in the past.   Has had BLE edema. No f/c/s. Has had episodic chest pain/pressure  Works for [employer], around chemicals/fumes at times. Denies other exposures  Aunt with sarcoidosis  Mother with lupus    Past Medical History:   Past Medical History:    Congestive heart disease (HCC)    Diabetes (HCC)    Essential hypertension    High blood pressure        Past Surgical History: History reviewed. No pertinent surgical history.      Family Medical History: History reviewed. No pertinent family history.     Social History:   Social History     Socioeconomic History    Marital status: Single     Spouse name: Not on file    Number of children: Not on file    Years of education: Not on file    Highest education level: Not on file   Occupational History    Not on file   Tobacco Use    Smoking status: Some Days     Types: Cigars    Smokeless tobacco: Never    Tobacco comments:     Smokes cigars on occasion for a year   Vaping Use    Vaping status: Never Used   Substance and Sexual Activity    Alcohol use: Not Currently    Drug use: Not Currently    Sexual activity: Not on file   Other Topics Concern    Not on file   Social History Narrative    Not on file     Social Drivers of Health     Food Insecurity: No Food Insecurity (11/4/2024)    Food Insecurity     Food Insecurity: Never true   Transportation Needs: No Transportation Needs (11/4/2024)    Transportation Needs     Lack of  Transportation: No     Car Seat: Not on file   Stress: Not on file   Housing Stability: Low Risk  (11/4/2024)    Housing Stability     Housing Instability: No     Housing Instability Emergency: Not on file     Crib or Bassinette: Not on file          Allergies: Shrimp and Codeine     Medications:   Current Outpatient Medications   Medication Sig Dispense Refill    cyclobenzaprine 10 MG Oral Tab cyclobenzaprine 10 MG Oral Tab, [RxNorm: 857377]      diclofenac 75 MG Oral Tab EC Take 1 tablet (75 mg total) by mouth 2 (two) times daily.      dilTIAZem HCl ER Beads 180 MG Oral Capsule SR 24 Hr Take 1 capsule (180 mg total) by mouth daily.      furosemide 20 MG Oral Tab Take 1 tablet (20 mg total) by mouth daily.      TRESIBA FLEXTOUCH 200 UNIT/ML Subcutaneous Solution Pen-injector INJECT 30 UNITS SUBCUTANEOUSLY ONCE DAILY (ROTATE INJECTION SITES)      omeprazole 20 MG Oral Capsule Delayed Release Take 1 capsule (20 mg total) by mouth daily.      OZEMPIC, 1 MG/DOSE, 4 MG/3ML Subcutaneous Solution Pen-injector 1 mg.      fluticasone furoate-vilanterol (BREO ELLIPTA) 100-25 MCG/ACT Inhalation Aerosol Powder, Breath Activated Inhale 1 puff into the lungs daily. 1 each 11    Blood Glucose Monitoring Suppl (ONETOUCH VERIO FLEX SYSTEM) w/Device Does not apply Kit Test blood sugar three times per day. 1 kit 0    Glucose Blood (ONETOUCH VERIO) In Vitro Strip Test blood sugar three  times per day. 100 strip 6    OneTouch Delica Lancets 33G Does not apply Misc Test blood sugar three times per day. 100 each 6    amLODIPine 5 MG Oral Tab Take 1 tablet (5 mg total) by mouth daily. 30 tablet 0    hydrALAZINE 50 MG Oral Tab Take 1 tablet (50 mg total) by mouth every 6 (six) hours. (Patient taking differently: Take 1 tablet (50 mg total) by mouth every 6 (six) hours. prn) 120 tablet 0    Blood Glucose Monitoring Suppl (ONETOUCH VERIO FLEX SYSTEM) w/Device Does not apply Kit Test blood sugar 4 times per day. 1 kit 0    Glucose Blood  (ONETOUCH VERIO) In Vitro Strip Test blood sugar 4 times per day. 100 strip 6    OneTouch Delica Lancets 33G Does not apply Misc Test blood sugar 4 times per day. 100 each 6    pravastatin 20 MG Oral Tab Take 2 tablets (40 mg total) by mouth nightly.      valsartan 160 MG Oral Tab Take 1 tablet (160 mg total) by mouth daily.      insulin aspart 100 UNIT/ML Subcutaneous Solution Cartridge Inject into the skin once.      metFORMIN 850 MG Oral Tab Take 1 tablet (850 mg total) by mouth 2 (two) times daily with meals.      gabapentin 600 MG Oral Tab Take 1 tablet (600 mg total) by mouth 3 (three) times daily.      allopurinol 300 MG Oral Tab Take 1 tablet (300 mg total) by mouth daily.      carvedilol 25 MG Oral Tab Take 1 tablet (25 mg total) by mouth 2 (two) times daily with meals.      glipiZIDE 10 MG Oral Tab Take 1 tablet (10 mg total) by mouth 2 (two) times daily before meals.      Insulin NPH & Regular 70/30 (70-30) 100 UNIT/ML Subcutaneous Suspension Inject into the skin 2 (two) times daily before meals.      allopurinol 100 MG Oral Tab Take 1 tablet (100 mg total) by mouth daily. (Patient not taking: Reported on 4/1/2025) 30 tablet 0       Review of Systems: Review of Systems   Constitutional: Negative.    HENT: Negative.     Respiratory:  Positive for cough and shortness of breath.    Cardiovascular:  Positive for chest pain and leg swelling.   All other systems reviewed and are negative.      Physical Exam:  BP (!) 140/92 (BP Location: Left arm, Patient Position: Sitting, Cuff Size: adult)   Pulse 98   Resp 16   Ht 6' 4\" (1.93 m)   Wt 268 lb (121.6 kg)   SpO2 99%   BMI 32.62 kg/m²      Constitutional: alert, cooperative. No acute distress.  HEENT: Head NC/AT.    Cardio: Regular rate and rhythm. Normal S1 and S2. No murmurs.   Respiratory: Thorax symmetrical with no labored breathing. Clear to ausculation bilaterally with symmetrical breath sounds. No wheezing, rhonchi, rales, or crackles.   GI: NABS. Abd  soft, non-tender.  Extremities: No clubbing or cyanosis. Trace BLE edema.    Neurologic: A&Ox3. No gross motor deficits.  Skin: Warm, dry  Psych: Calm, cooperative. Pleasant affect.    Results:  Personally reviewed    WBC: 10, done on 11/3/2024.  HGB: 13.4, done on 11/3/2024.  PLT: 276, done on 11/3/2024.     Glucose: 87, done on 11/4/2024.  Cr: 1.34, done on 11/4/2024.  Last eGFR was 66 on 11/4/2024.  CA: 8.9, done on 11/4/2024.  Na: 140, done on 11/4/2024.  K: 4.2, done on 11/4/2024.  Cl: 107, done on 11/4/2024.  CO2: 28, done on 11/4/2024.  Last ALB was 4.4% done on 11/3/2024.     No results found.     Assessment/Plan:  #1. Dyspnea on exertion  Likely related to heart failure but question if there is underlying sarcoidosis.  His aunt has sarcoidosis and mother with lupus  11/2024 CXR clear  Obtain PFTs and CT chest  Obtain autoimmune testing and ACE level  May need bronchoscopy/biopsy if abnormal lung findings or LAD    #2. Chronic cough  Unclear if related to above or not  Obtain PFTs  Obtain CT  Trial of ICS/LABA post PFT testing    #3. BLE edema  Likely related to NICM      Fady Santizo MD  4/1/2025

## 2025-05-19 ENCOUNTER — RT VISIT (OUTPATIENT)
Dept: RESPIRATORY THERAPY | Facility: HOSPITAL | Age: 50
End: 2025-05-19
Attending: INTERNAL MEDICINE
Payer: MEDICAID

## 2025-05-19 DIAGNOSIS — R06.09 DOE (DYSPNEA ON EXERTION): ICD-10-CM

## 2025-05-28 ENCOUNTER — TELEPHONE (OUTPATIENT)
Facility: CLINIC | Age: 50
End: 2025-05-28

## 2025-05-28 DIAGNOSIS — R06.09 DOE (DYSPNEA ON EXERTION): Primary | ICD-10-CM

## 2025-05-28 RX ORDER — FLUTICASONE PROPIONATE AND SALMETEROL XINAFOATE 115; 21 UG/1; UG/1
2 AEROSOL, METERED RESPIRATORY (INHALATION) 2 TIMES DAILY
Qty: 3 EACH | Refills: 3 | Status: SHIPPED | OUTPATIENT
Start: 2025-05-28 | End: 2025-05-28

## 2025-05-28 RX ORDER — FLUTICASONE PROPIONATE AND SALMETEROL XINAFOATE 115; 21 UG/1; UG/1
2 AEROSOL, METERED RESPIRATORY (INHALATION) 2 TIMES DAILY
Qty: 3 EACH | Refills: 3 | Status: SHIPPED | OUTPATIENT
Start: 2025-05-28

## 2025-05-28 NOTE — TELEPHONE ENCOUNTER
Received a fax from [employer].  Key:  [id number]   Breo 100-25 not covered.  Advair HFA and Dulera are \"likely covered.\"  Please advise.

## 2025-05-29 PROCEDURE — 94729 DIFFUSING CAPACITY: CPT | Performed by: INTERNAL MEDICINE

## 2025-05-29 PROCEDURE — 94010 BREATHING CAPACITY TEST: CPT | Performed by: INTERNAL MEDICINE

## 2025-05-29 PROCEDURE — 94726 PLETHYSMOGRAPHY LUNG VOLUMES: CPT | Performed by: INTERNAL MEDICINE

## 2025-05-29 NOTE — PROCEDURES
Pulmonary Function Test Report  Findings:  Prebronchodilator FEV1 is 3.60L, z-score -1.51.  Prebronchodilator FVC is 4.82L, z-score -1.27.                           FEV1/ FVC ratio is 75 %, z-score -0.62.   The flow-volume loop demonstrates a normal pattern.  The TLC is 6.90L, z-score -1.80. The residual volume 2.08L, z-score -0.19.   The diffusion capacity is 21.66, z-score -2.87 and -1.59 when corrected for alveolar volume.     Impression:  Spirometry is normal.  There is mild (z-score -1.65 to -2.5) restriction.  This can be seen in heart failure, fluid overload states, interstitial lung disease, thoracic spine/chest disorders, obesity as well as other clinical scenarios.  Further clinical correlation required.      Diffusion capacity is moderate z-score (-2.51 to -4.0).  DLCO improves to normal when considering alveolar volume. This may represent a normal finding but can be seen in emphysema, interstitial lung disease, pulmonary vascular disease (such as pulmonary hypertension) and anemia. If not already performed, would suggest further evaluation as determined clinically  Disclaimer: This PFT has been interpreted based on Z-score/LLN/ULN criteria as described in ATS guidelines 2022.   Vianney Bernal MD  [facility] Pulmonary Medicine  Office: [phone]

## 2025-08-12 ENCOUNTER — TELEPHONE (OUTPATIENT)
Facility: CLINIC | Age: 50
End: 2025-08-12

## 2025-08-26 ENCOUNTER — OFFICE VISIT (OUTPATIENT)
Facility: CLINIC | Age: 50
End: 2025-08-26

## 2025-08-26 ENCOUNTER — APPOINTMENT (OUTPATIENT)
Dept: GENERAL RADIOLOGY | Facility: HOSPITAL | Age: 50
End: 2025-08-26
Attending: EMERGENCY MEDICINE

## 2025-08-26 ENCOUNTER — APPOINTMENT (OUTPATIENT)
Dept: CT IMAGING | Facility: HOSPITAL | Age: 50
End: 2025-08-26
Attending: EMERGENCY MEDICINE

## 2025-08-26 ENCOUNTER — HOSPITAL ENCOUNTER (OUTPATIENT)
Facility: HOSPITAL | Age: 50
Setting detail: OBSERVATION
Discharge: HOME | End: 2025-08-27
Attending: EMERGENCY MEDICINE | Admitting: HOSPITALIST

## 2025-08-26 VITALS
HEART RATE: 96 BPM | RESPIRATION RATE: 16 BRPM | BODY MASS INDEX: 32.88 KG/M2 | DIASTOLIC BLOOD PRESSURE: 76 MMHG | HEIGHT: 76 IN | OXYGEN SATURATION: 98 % | WEIGHT: 270 LBS | SYSTOLIC BLOOD PRESSURE: 136 MMHG

## 2025-08-26 DIAGNOSIS — R05.3 CHRONIC COUGH: ICD-10-CM

## 2025-08-26 DIAGNOSIS — R06.09 DOE (DYSPNEA ON EXERTION): Primary | ICD-10-CM

## 2025-08-26 DIAGNOSIS — R07.9 CHEST PAIN WITH MODERATE RISK FOR CARDIAC ETIOLOGY: Primary | ICD-10-CM

## 2025-08-26 PROBLEM — I26.99 PE (PULMONARY THROMBOEMBOLISM) (HCC): Status: ACTIVE | Noted: 2025-08-26

## 2025-08-26 LAB
ALBUMIN SERPL-MCNC: 4.4 G/DL (ref 3.2–4.8)
ALBUMIN/GLOB SERPL: 1.6 (ref 1–2)
ALP LIVER SERPL-CCNC: 68 U/L (ref 45–117)
ALT SERPL-CCNC: 28 U/L (ref 10–49)
ANION GAP SERPL CALC-SCNC: 12 MMOL/L (ref 0–18)
AST SERPL-CCNC: 22 U/L (ref ?–34)
ATRIAL RATE: 80 BPM
BASOPHILS # BLD AUTO: 0.06 X10(3) UL (ref 0–0.2)
BASOPHILS NFR BLD AUTO: 0.8 %
BILIRUB SERPL-MCNC: 0.3 MG/DL (ref 0.3–1.2)
BUN BLD-MCNC: 12 MG/DL (ref 9–23)
CALCIUM BLD-MCNC: 9.7 MG/DL (ref 8.7–10.6)
CHLORIDE SERPL-SCNC: 105 MMOL/L (ref 98–112)
CO2 SERPL-SCNC: 24 MMOL/L (ref 21–32)
CREAT BLD-MCNC: 1.29 MG/DL (ref 0.7–1.3)
D DIMER PPP FEU-MCNC: 2.75 UG/ML FEU (ref ?–0.5)
EGFRCR SERPLBLD CKD-EPI 2021: 68 ML/MIN/1.73M2 (ref 60–?)
EOSINOPHIL # BLD AUTO: 0.32 X10(3) UL (ref 0–0.7)
EOSINOPHIL NFR BLD AUTO: 4.5 %
ERYTHROCYTE [DISTWIDTH] IN BLOOD BY AUTOMATED COUNT: 13.7 %
EST. AVERAGE GLUCOSE BLD GHB EST-MCNC: 252 MG/DL (ref 68–126)
GLOBULIN PLAS-MCNC: 2.7 G/DL (ref 2–3.5)
GLUCOSE BLD-MCNC: 175 MG/DL (ref 70–99)
GLUCOSE BLD-MCNC: 191 MG/DL (ref 70–99)
HBA1C MFR BLD: 10.4 % (ref ?–5.7)
HCT VFR BLD AUTO: 40.2 % (ref 39–53)
HGB BLD-MCNC: 13.9 G/DL (ref 13–17.5)
IMM GRANULOCYTES # BLD AUTO: 0.03 X10(3) UL (ref 0–1)
IMM GRANULOCYTES NFR BLD: 0.4 %
LYMPHOCYTES # BLD AUTO: 2.79 X10(3) UL (ref 1–4)
LYMPHOCYTES NFR BLD AUTO: 39.1 %
MCH RBC QN AUTO: 27.2 PG (ref 26–34)
MCHC RBC AUTO-ENTMCNC: 34.6 G/DL (ref 31–37)
MCV RBC AUTO: 78.7 FL (ref 80–100)
MONOCYTES # BLD AUTO: 0.58 X10(3) UL (ref 0.1–1)
MONOCYTES NFR BLD AUTO: 8.1 %
NEUTROPHILS # BLD AUTO: 3.35 X10 (3) UL (ref 1.5–7.7)
NEUTROPHILS # BLD AUTO: 3.35 X10(3) UL (ref 1.5–7.7)
NEUTROPHILS NFR BLD AUTO: 47.1 %
OSMOLALITY SERPL CALC.SUM OF ELEC: 297 MOSM/KG (ref 275–295)
P AXIS: 19 DEGREES
P-R INTERVAL: 156 MS
PLATELET # BLD AUTO: 311 10(3)UL (ref 150–450)
POTASSIUM SERPL-SCNC: 3.4 MMOL/L (ref 3.5–5.1)
PROT SERPL-MCNC: 7.1 G/DL (ref 5.7–8.2)
Q-T INTERVAL: 372 MS
QRS DURATION: 92 MS
QTC CALCULATION (BEZET): 429 MS
R AXIS: -35 DEGREES
RBC # BLD AUTO: 5.11 X10(6)UL (ref 4.3–5.7)
SODIUM SERPL-SCNC: 141 MMOL/L (ref 136–145)
T AXIS: 22 DEGREES
TROPONIN I SERPL HS-MCNC: 13 NG/L (ref ?–53)
TROPONIN I SERPL HS-MCNC: 15 NG/L (ref ?–53)
VENTRICULAR RATE: 80 BPM
WBC # BLD AUTO: 7.1 X10(3) UL (ref 4–11)

## 2025-08-26 PROCEDURE — 71275 CT ANGIOGRAPHY CHEST: CPT | Performed by: EMERGENCY MEDICINE

## 2025-08-26 PROCEDURE — 99214 OFFICE O/P EST MOD 30 MIN: CPT

## 2025-08-26 PROCEDURE — 99223 1ST HOSP IP/OBS HIGH 75: CPT | Performed by: HOSPITALIST

## 2025-08-26 PROCEDURE — 71045 X-RAY EXAM CHEST 1 VIEW: CPT | Performed by: EMERGENCY MEDICINE

## 2025-08-26 RX ORDER — NICOTINE POLACRILEX 4 MG
30 LOZENGE BUCCAL
Status: DISCONTINUED | OUTPATIENT
Start: 2025-08-26 | End: 2025-08-27

## 2025-08-26 RX ORDER — FLUTICASONE PROPIONATE AND SALMETEROL 250; 50 UG/1; UG/1
1 POWDER RESPIRATORY (INHALATION) 2 TIMES DAILY
Status: DISCONTINUED | OUTPATIENT
Start: 2025-08-26 | End: 2025-08-27

## 2025-08-26 RX ORDER — PRAVASTATIN SODIUM 20 MG
40 TABLET ORAL NIGHTLY
Status: DISCONTINUED | OUTPATIENT
Start: 2025-08-26 | End: 2025-08-27

## 2025-08-26 RX ORDER — DILTIAZEM HYDROCHLORIDE 180 MG/1
180 CAPSULE, EXTENDED RELEASE ORAL DAILY
Status: DISCONTINUED | OUTPATIENT
Start: 2025-08-27 | End: 2025-08-27

## 2025-08-26 RX ORDER — CARVEDILOL 12.5 MG/1
25 TABLET ORAL 2 TIMES DAILY WITH MEALS
Status: DISCONTINUED | OUTPATIENT
Start: 2025-08-26 | End: 2025-08-27

## 2025-08-26 RX ORDER — DEXTROSE MONOHYDRATE 25 G/50ML
50 INJECTION, SOLUTION INTRAVENOUS
Status: DISCONTINUED | OUTPATIENT
Start: 2025-08-26 | End: 2025-08-27

## 2025-08-26 RX ORDER — PROCHLORPERAZINE EDISYLATE 5 MG/ML
5 INJECTION INTRAMUSCULAR; INTRAVENOUS EVERY 8 HOURS PRN
Status: DISCONTINUED | OUTPATIENT
Start: 2025-08-26 | End: 2025-08-27

## 2025-08-26 RX ORDER — ENOXAPARIN SODIUM 100 MG/ML
115 INJECTION SUBCUTANEOUS ONCE
Status: DISCONTINUED | OUTPATIENT
Start: 2025-08-26 | End: 2025-08-26

## 2025-08-26 RX ORDER — BISACODYL 10 MG
10 SUPPOSITORY, RECTAL RECTAL
Status: DISCONTINUED | OUTPATIENT
Start: 2025-08-26 | End: 2025-08-27

## 2025-08-26 RX ORDER — GABAPENTIN 600 MG/1
600 TABLET ORAL 3 TIMES DAILY
Status: DISCONTINUED | OUTPATIENT
Start: 2025-08-26 | End: 2025-08-26

## 2025-08-26 RX ORDER — HYDRALAZINE HYDROCHLORIDE 50 MG/1
50 TABLET, FILM COATED ORAL 2 TIMES DAILY
Status: DISCONTINUED | OUTPATIENT
Start: 2025-08-26 | End: 2025-08-27

## 2025-08-26 RX ORDER — INSULIN LISPRO 100 [IU]/ML
10 INJECTION, SOLUTION INTRAVENOUS; SUBCUTANEOUS 3 TIMES DAILY
COMMUNITY

## 2025-08-26 RX ORDER — ACETAMINOPHEN 500 MG
1000 TABLET ORAL ONCE
Status: COMPLETED | OUTPATIENT
Start: 2025-08-26 | End: 2025-08-26

## 2025-08-26 RX ORDER — INSULIN DEGLUDEC 100 U/ML
30 INJECTION, SOLUTION SUBCUTANEOUS NIGHTLY
Status: DISCONTINUED | OUTPATIENT
Start: 2025-08-26 | End: 2025-08-27

## 2025-08-26 RX ORDER — SENNOSIDES 8.6 MG
17.2 TABLET ORAL NIGHTLY PRN
Status: DISCONTINUED | OUTPATIENT
Start: 2025-08-26 | End: 2025-08-27

## 2025-08-26 RX ORDER — PANTOPRAZOLE SODIUM 40 MG/1
40 TABLET, DELAYED RELEASE ORAL
Status: DISCONTINUED | OUTPATIENT
Start: 2025-08-27 | End: 2025-08-27

## 2025-08-26 RX ORDER — ONDANSETRON 2 MG/ML
4 INJECTION INTRAMUSCULAR; INTRAVENOUS EVERY 6 HOURS PRN
Status: DISCONTINUED | OUTPATIENT
Start: 2025-08-26 | End: 2025-08-27

## 2025-08-26 RX ORDER — AMLODIPINE BESYLATE 5 MG/1
5 TABLET ORAL DAILY
Status: DISCONTINUED | OUTPATIENT
Start: 2025-08-26 | End: 2025-08-27

## 2025-08-26 RX ORDER — IPRATROPIUM BROMIDE AND ALBUTEROL SULFATE 2.5; .5 MG/3ML; MG/3ML
3 SOLUTION RESPIRATORY (INHALATION) EVERY 6 HOURS PRN
Status: DISCONTINUED | OUTPATIENT
Start: 2025-08-26 | End: 2025-08-27

## 2025-08-26 RX ORDER — LOSARTAN POTASSIUM 100 MG/1
100 TABLET ORAL DAILY
Status: DISCONTINUED | OUTPATIENT
Start: 2025-08-27 | End: 2025-08-27

## 2025-08-26 RX ORDER — NITROGLYCERIN 0.4 MG/1
0.4 TABLET SUBLINGUAL EVERY 5 MIN PRN
Status: DISCONTINUED | OUTPATIENT
Start: 2025-08-26 | End: 2025-08-27

## 2025-08-26 RX ORDER — FUROSEMIDE 20 MG/1
20 TABLET ORAL DAILY
Status: DISCONTINUED | OUTPATIENT
Start: 2025-08-27 | End: 2025-08-27

## 2025-08-26 RX ORDER — SODIUM PHOSPHATE, DIBASIC AND SODIUM PHOSPHATE, MONOBASIC 7; 19 G/230ML; G/230ML
1 ENEMA RECTAL ONCE AS NEEDED
Status: DISCONTINUED | OUTPATIENT
Start: 2025-08-26 | End: 2025-08-27

## 2025-08-26 RX ORDER — POLYETHYLENE GLYCOL 3350 17 G/17G
17 POWDER, FOR SOLUTION ORAL DAILY PRN
Status: DISCONTINUED | OUTPATIENT
Start: 2025-08-26 | End: 2025-08-27

## 2025-08-26 RX ORDER — ENOXAPARIN SODIUM 100 MG/ML
40 INJECTION SUBCUTANEOUS DAILY
Status: DISCONTINUED | OUTPATIENT
Start: 2025-08-27 | End: 2025-08-27

## 2025-08-26 RX ORDER — POTASSIUM CHLORIDE 1500 MG/1
40 TABLET, EXTENDED RELEASE ORAL EVERY 4 HOURS
Status: COMPLETED | OUTPATIENT
Start: 2025-08-26 | End: 2025-08-27

## 2025-08-26 RX ORDER — NICOTINE POLACRILEX 4 MG
15 LOZENGE BUCCAL
Status: DISCONTINUED | OUTPATIENT
Start: 2025-08-26 | End: 2025-08-27

## 2025-08-26 RX ORDER — ALLOPURINOL 300 MG/1
300 TABLET ORAL DAILY
Status: DISCONTINUED | OUTPATIENT
Start: 2025-08-27 | End: 2025-08-27

## 2025-08-27 VITALS
WEIGHT: 251.63 LBS | TEMPERATURE: 98 F | HEIGHT: 76 IN | HEART RATE: 78 BPM | RESPIRATION RATE: 18 BRPM | OXYGEN SATURATION: 98 % | BODY MASS INDEX: 30.64 KG/M2 | DIASTOLIC BLOOD PRESSURE: 74 MMHG | SYSTOLIC BLOOD PRESSURE: 130 MMHG

## 2025-08-27 LAB
ANION GAP SERPL CALC-SCNC: 8 MMOL/L (ref 0–18)
BUN BLD-MCNC: 8 MG/DL (ref 9–23)
CALCIUM BLD-MCNC: 8.8 MG/DL (ref 8.7–10.6)
CHLORIDE SERPL-SCNC: 105 MMOL/L (ref 98–112)
CO2 SERPL-SCNC: 27 MMOL/L (ref 21–32)
CREAT BLD-MCNC: 1.24 MG/DL (ref 0.7–1.3)
EGFRCR SERPLBLD CKD-EPI 2021: 72 ML/MIN/1.73M2 (ref 60–?)
GLUCOSE BLD-MCNC: 206 MG/DL (ref 70–99)
GLUCOSE BLD-MCNC: 215 MG/DL (ref 70–99)
GLUCOSE BLD-MCNC: 223 MG/DL (ref 70–99)
GLUCOSE BLD-MCNC: 278 MG/DL (ref 70–99)
OSMOLALITY SERPL CALC.SUM OF ELEC: 295 MOSM/KG (ref 275–295)
POTASSIUM SERPL-SCNC: 3.7 MMOL/L (ref 3.5–5.1)
POTASSIUM SERPL-SCNC: 3.7 MMOL/L (ref 3.5–5.1)
SODIUM SERPL-SCNC: 140 MMOL/L (ref 136–145)
TROPONIN I SERPL HS-MCNC: 15 NG/L (ref ?–53)

## 2025-08-27 PROCEDURE — 99239 HOSP IP/OBS DSCHRG MGMT >30: CPT | Performed by: STUDENT IN AN ORGANIZED HEALTH CARE EDUCATION/TRAINING PROGRAM

## 2025-08-27 RX ORDER — ASPIRIN 81 MG/1
81 TABLET ORAL DAILY
Qty: 90 TABLET | Refills: 1 | Status: SHIPPED | OUTPATIENT
Start: 2025-08-27

## 2025-08-27 RX ORDER — HYDRALAZINE HYDROCHLORIDE 50 MG/1
50 TABLET, FILM COATED ORAL 2 TIMES DAILY
Qty: 60 TABLET | Refills: 0 | Status: SHIPPED | OUTPATIENT
Start: 2025-08-27 | End: 2025-09-26

## 2025-08-27 RX ORDER — ACETAMINOPHEN 500 MG
1000 TABLET ORAL EVERY 6 HOURS PRN
Status: DISCONTINUED | OUTPATIENT
Start: 2025-08-27 | End: 2025-08-27

## 2025-08-27 RX ORDER — PANTOPRAZOLE SODIUM 40 MG/1
40 TABLET, DELAYED RELEASE ORAL
Qty: 60 TABLET | Refills: 0 | Status: SHIPPED | OUTPATIENT
Start: 2025-08-27 | End: 2025-09-26

## 2025-08-27 RX ORDER — POTASSIUM CHLORIDE 1500 MG/1
40 TABLET, EXTENDED RELEASE ORAL ONCE
Status: COMPLETED | OUTPATIENT
Start: 2025-08-27 | End: 2025-08-27

## 2025-08-29 PROBLEM — Z79.4 TYPE 2 DIABETES MELLITUS WITHOUT COMPLICATION, WITH LONG-TERM CURRENT USE OF INSULIN (HCC): Status: ACTIVE | Noted: 2021-11-09

## 2025-08-29 PROBLEM — E11.9 TYPE 2 DIABETES MELLITUS WITHOUT COMPLICATION, WITH LONG-TERM CURRENT USE OF INSULIN (HCC): Status: ACTIVE | Noted: 2021-11-09

## (undated) NOTE — LETTER
Date & Time: 10/11/2024, 5:49 PM  Patient: Karel Sy  Encounter Provider(s):    Tommie Schneider MD Salame, Grace, MD Pitroda, Tanya, MD Swiatek, Martina ASHRAF, Alia Palomo APRN       To Whom It May Concern:    Karel Sy was seen and treated in our department on 10/8-10/10/2024. He can return to work on Monday, October 14, 2024.    If you have any questions or concerns, please do not hesitate to call.        ____________Dr Shelley Anaya_________________  Physician/APC Signature

## (undated) NOTE — LETTER
Date & Time: 10/11/2024, 6:02 PM  Patient: Karel Sy  Encounter Provider(s):    Tommie Schneider MD Salame, Grace, MD Pitroda, Tanya, MD Swiatek, Martina ASHRAF, Alia Palomo APRN       To Whom It May Concern:    Karel Sy was seen and treated in our department on 10/8-11/2024. He can return to work.    If you have any questions or concerns, please do not hesitate to call.        _______Dr Shelley Anaya______________________  Physician/APC Signature

## (undated) NOTE — LETTER
[address]  [address]  Authorization for Surgical Operation and Procedure     Date:___________                                                                                                         Time:__________  I hereby authorize Laura Quezada my physician and his/her assistants (if applicable), which may include medical students, residents, and/or fellows, to perform the following surgical operation/ procedure and administer such anesthesia as may be determined necessary by my physician:  Operation/Procedure name (s) Cardiac catheterization, left ventricular cineangiography, bilateral selective coronary arteriography and/or right heart catheterization.  Possible percutaneous transluminal coronary angioplasty, coronary atherectomy, coronary stent, intracoronary thrombolytic therapy.  on Karel Sy   2.   I recognize that during the surgical operation/procedure, unforeseen conditions may necessitate additional or different procedures than those listed above.  I, therefore, further authorize and request that the above-named surgeon, assistants, or designees perform such procedures as are, in their judgment, necessary and desirable.    3.   My surgeon/physician has discussed prior to my surgery the potential benefits, risks and side effects of this procedure; the likelihood of achieving goals; and potential problems that might occur during recuperation.  They also discussed reasonable alternatives to the procedure, including risks, benefits, and side effects related to the alternatives and risks related to not receiving this procedure.  I have had all my questions answered and I acknowledge that no guarantee has been made as to the result that may be obtained.    4.   Should the need arise during my operation/procedure, which includes change of level of care prior to discharge, I also consent to the administration of blood and/or blood products.  Further, I  understand that despite careful testing and screening of blood or blood products by collecting agencies, I may still be subject to ill effects as a result of receiving a blood transfusion and/or blood products.  The following are some, but not all, of the potential risks that can occur: fever and allergic reactions, hemolytic reactions, transmission of diseases such as Hepatitis, AIDS and Cytomegalovirus (CMV) and fluid overload.  In the event that I wish to have an autologous transfusion of my own blood, or a directed donor transfusion, I will discuss this with my physician.  Check only if Refusing Blood or Blood Products  I understand refusal of blood or blood products as deemed necessary by my physician may have serious consequences to my condition to include possible death. I hereby assume responsibility for my refusal and release the hospital, its personnel, and my physicians from any responsibility for the consequences of my refusal.          o  Refuse      5.   I authorize the use of any specimen, organs, tissues, body parts or foreign objects that may be removed from my body during the operation/procedure for diagnosis, research or teaching purposes and their subsequent disposal by hospital authorities.  I also authorize the release of specimen test results and/or written reports to my treating physician on the hospital medical staff or other referring or consulting physicians involved in my care, at the discretion of the Pathologist or my treating physician.    6.   I consent to the photographing or videotaping of the operations or procedures to be performed, including appropriate portions of my body for medical, scientific, or educational purposes, provided my identity is not revealed by the pictures or by descriptive texts accompanying them.  If the procedure has been photographed/videotaped, the surgeon will obtain the original picture, image, videotape or CD.  The hospital will not be responsible for  storage, release or maintenance of the picture, image, tape or CD.    7.   I consent to the presence of a  or observers in the operating room as deemed necessary by my physician or their designees.    8.   I recognize that in the event my procedure results in extended X-Ray/fluoroscopy time, I may develop a skin reaction.    9. If I have a Do Not Attempt Resuscitation (DNAR) order in place, that status will be suspended while in the operating room, procedural suite, and during the recovery period unless otherwise explicitly stated by me (or a person authorized to consent on my behalf). The surgeon or my attending physician will determine when the applicable recovery period ends for purposes of reinstating the DNAR order.  10. Patients having a sterilization procedure: I understand that if the procedure is successful the results will be permanent and it will therefore be impossible for me to inseminate, conceive, or bear children.  I also understand that the procedure is intended to result in sterility, although the result has not been guaranteed.   11. I acknowledge that my physician has explained sedation/analgesia administration to me including the risk and benefits I consent to the administration of sedation/analgesia as may be necessary or desirable in the judgment of my physician.    I CERTIFY THAT I HAVE READ AND FULLY UNDERSTAND THE ABOVE CONSENT TO OPERATION and/or OTHER PROCEDURE.    _________________________________________  __________________________________  Signature of Patient     Signature of Responsible Person         ___________________________________         Printed Name of Responsible Person           _________________________________                 Relationship to Patient  _________________________________________  ______________________________  Signature of Witness          Date  Time      Patient Name: Karel Sy     : 1975                 Printed: October 10,  2024     Medical Record #: [id number]                     Page 1 of 2                                    [address]  [address]    Consent for Anesthesia    I, Karel Sy agree to be cared for by an anesthesiologist, who is specially trained to monitor me and give me medicine to put me to sleep or keep me comfortable during my procedure    I understand that my anesthesiologist is not an employee or agent of [facility] or [employer]. He or she works for [employer].    As the patient asking for anesthesia services, I agree to:  Allow the anesthesiologist (anesthesia doctor) to give me medicine and do additional procedures as necessary. Some examples are: Starting or using an “IV” to give me medicine, fluids or blood during my procedure, and having a breathing tube placed to help me breathe when I’m asleep (intubation). In the event that my heart stops working properly, I understand that my anesthesiologist will make every effort to sustain my life, unless otherwise directed by [facility] Do Not Resuscitate documents.  Tell my anesthesia doctor before my procedure:  If I am pregnant.  The last time that I ate or drank.  All of the medicines I take (including prescriptions, herbal supplements, and pills I can buy without a prescription (including street drugs/illegal medications). Failure to inform my anesthesiologist about these medicines may increase my risk of anesthetic complications.  If I am allergic to anything or have had a reaction to anesthesia before.  I understand how the anesthesia medicine will help me (benefits).  I understand that with any type of anesthesia medicine there are risks:  The most common risks are: nausea, vomiting, sore throat, muscle soreness, damage to my eyes, mouth, or teeth (from breathing tube placement).  Rare risks include: remembering what happened during my procedure, allergic reactions to  medications, injury to my airway, heart, lungs, vision, nerves, or muscles and in extremely rare instances death.  My doctor has explained to me other choices available to me for my care (alternatives).  Pregnant Patients (“epidural”):  I understand that the risks of having an epidural (medicine given into my back to help control pain during labor), include itching, low blood pressure, difficulty urinating, headache or slowing of the baby’s heart. Very rare risks include infection, bleeding, seizure, irregular heart rhythms and nerve injury.  Regional Anesthesia (“spinal”, “epidural”, & “nerve blocks”):  I understand that rare but potential complications include headache, bleeding, infection, seizure, irregular heart rhythms, and nerve injury.    I can change my mind about having anesthesia services at any time before I get the medicine.    _____________________________________________________________________________  Patient (or Representative) Signature/Relationship to Patient  Date   Time    _____________________________________________________________________________   Name (if used)    Language/Organization   Time    _____________________________________________________________________________  Anesthesiologist Signature     Date   Time  I have discussed the procedure and information above with the patient (or patient’s representative) and answered their questions. The patient or their representative has agreed to have anesthesia services.    _____________________________________________________________________________  Witness        Date   Time  I have verified that the signature is that of the patient or patient’s representative, and that it was signed before the procedure  Patient Name: Karel Sy     : 1975                 Printed: October 10, 2024     Medical Record #: [id number]                     Page 2 of 2

## (undated) NOTE — LETTER
Date & Time: 10/11/2024, 6:04 PM  Patient: Karel Sy  Encounter Provider(s):    Tommie Schneider MD Salame, Grace, MD Pitroda, Tanya, MD Swiatek, Martina ASHRAF, Alia Palomo APRN       To Whom It May Concern:    Karel Sy was seen and treated in our department on 10/8-10/11/2024. He can return to work on Monday, October 14, 2024.    If you have any questions or concerns, please do not hesitate to call.        _____Dr Shelley Anaya________________________  Physician/[facility] Signature